# Patient Record
Sex: FEMALE | Race: WHITE | NOT HISPANIC OR LATINO | URBAN - METROPOLITAN AREA
[De-identification: names, ages, dates, MRNs, and addresses within clinical notes are randomized per-mention and may not be internally consistent; named-entity substitution may affect disease eponyms.]

---

## 2021-01-01 ENCOUNTER — INPATIENT (INPATIENT)
Facility: HOSPITAL | Age: 66
LOS: 7 days | End: 2021-06-11
Attending: STUDENT IN AN ORGANIZED HEALTH CARE EDUCATION/TRAINING PROGRAM | Admitting: STUDENT IN AN ORGANIZED HEALTH CARE EDUCATION/TRAINING PROGRAM
Payer: MEDICARE

## 2021-01-01 VITALS
DIASTOLIC BLOOD PRESSURE: 95 MMHG | OXYGEN SATURATION: 94 % | HEART RATE: 155 BPM | TEMPERATURE: 98 F | RESPIRATION RATE: 24 BRPM | SYSTOLIC BLOOD PRESSURE: 146 MMHG

## 2021-01-01 VITALS — RESPIRATION RATE: 26 BRPM | OXYGEN SATURATION: 81 %

## 2021-01-01 DIAGNOSIS — Z02.9 ENCOUNTER FOR ADMINISTRATIVE EXAMINATIONS, UNSPECIFIED: ICD-10-CM

## 2021-01-01 DIAGNOSIS — C56.9 MALIGNANT NEOPLASM OF UNSPECIFIED OVARY: ICD-10-CM

## 2021-01-01 DIAGNOSIS — R06.00 DYSPNEA, UNSPECIFIED: ICD-10-CM

## 2021-01-01 DIAGNOSIS — Z51.5 ENCOUNTER FOR PALLIATIVE CARE: ICD-10-CM

## 2021-01-01 DIAGNOSIS — Z87.730 PERSONAL HISTORY OF (CORRECTED) CLEFT LIP AND PALATE: Chronic | ICD-10-CM

## 2021-01-01 DIAGNOSIS — R18.0 MALIGNANT ASCITES: ICD-10-CM

## 2021-01-01 DIAGNOSIS — J96.01 ACUTE RESPIRATORY FAILURE WITH HYPOXIA: ICD-10-CM

## 2021-01-01 DIAGNOSIS — R00.0 TACHYCARDIA, UNSPECIFIED: ICD-10-CM

## 2021-01-01 DIAGNOSIS — J90 PLEURAL EFFUSION, NOT ELSEWHERE CLASSIFIED: ICD-10-CM

## 2021-01-01 DIAGNOSIS — R30.0 DYSURIA: ICD-10-CM

## 2021-01-01 DIAGNOSIS — E83.52 HYPERCALCEMIA: ICD-10-CM

## 2021-01-01 DIAGNOSIS — K59.00 CONSTIPATION, UNSPECIFIED: ICD-10-CM

## 2021-01-01 LAB
24R-OH-CALCIDIOL SERPL-MCNC: 5.6 NG/ML — LOW (ref 30–80)
24R-OH-CALCIDIOL SERPL-MCNC: <5 NG/ML — LOW (ref 30–80)
ALBUMIN FLD-MCNC: 2.2 G/DL — SIGNIFICANT CHANGE UP
ALBUMIN FLD-MCNC: 2.2 G/DL — SIGNIFICANT CHANGE UP
ALBUMIN SERPL ELPH-MCNC: 2.4 G/DL — LOW (ref 3.3–5)
ALBUMIN SERPL ELPH-MCNC: 2.8 G/DL — LOW (ref 3.3–5)
ALBUMIN SERPL ELPH-MCNC: 3 G/DL — LOW (ref 3.3–5)
ALBUMIN SERPL ELPH-MCNC: 3.2 G/DL — LOW (ref 3.3–5)
ALBUMIN SERPL ELPH-MCNC: 3.4 G/DL — SIGNIFICANT CHANGE UP (ref 3.3–5)
ALBUMIN SERPL ELPH-MCNC: 3.5 G/DL — SIGNIFICANT CHANGE UP (ref 3.3–5)
ALBUMIN SERPL ELPH-MCNC: 3.6 G/DL — SIGNIFICANT CHANGE UP (ref 3.3–5)
ALP SERPL-CCNC: 101 U/L — SIGNIFICANT CHANGE UP (ref 40–120)
ALP SERPL-CCNC: 106 U/L — SIGNIFICANT CHANGE UP (ref 40–120)
ALP SERPL-CCNC: 110 U/L — SIGNIFICANT CHANGE UP (ref 40–120)
ALP SERPL-CCNC: 136 U/L — HIGH (ref 40–120)
ALP SERPL-CCNC: 89 U/L — SIGNIFICANT CHANGE UP (ref 40–120)
ALT FLD-CCNC: 10 U/L — SIGNIFICANT CHANGE UP (ref 4–33)
ALT FLD-CCNC: 11 U/L — SIGNIFICANT CHANGE UP (ref 4–33)
ALT FLD-CCNC: 11 U/L — SIGNIFICANT CHANGE UP (ref 4–33)
ALT FLD-CCNC: 12 U/L — SIGNIFICANT CHANGE UP (ref 4–33)
ALT FLD-CCNC: 5 U/L — SIGNIFICANT CHANGE UP (ref 4–33)
ALT FLD-CCNC: 8 U/L — SIGNIFICANT CHANGE UP (ref 4–33)
ALT FLD-CCNC: 8 U/L — SIGNIFICANT CHANGE UP (ref 4–33)
ANION GAP SERPL CALC-SCNC: 16 MMOL/L — HIGH (ref 7–14)
ANION GAP SERPL CALC-SCNC: 17 MMOL/L — HIGH (ref 7–14)
ANION GAP SERPL CALC-SCNC: 17 MMOL/L — HIGH (ref 7–14)
ANION GAP SERPL CALC-SCNC: 18 MMOL/L — HIGH (ref 7–14)
ANION GAP SERPL CALC-SCNC: 18 MMOL/L — HIGH (ref 7–14)
ANION GAP SERPL CALC-SCNC: 19 MMOL/L — HIGH (ref 7–14)
ANION GAP SERPL CALC-SCNC: 20 MMOL/L — HIGH (ref 7–14)
ANION GAP SERPL CALC-SCNC: 21 MMOL/L — HIGH (ref 7–14)
ANION GAP SERPL CALC-SCNC: 21 MMOL/L — HIGH (ref 7–14)
APPEARANCE UR: CLEAR — SIGNIFICANT CHANGE UP
APTT BLD: 24.7 SEC — LOW (ref 27–36.3)
APTT BLD: 32.7 SEC — SIGNIFICANT CHANGE UP (ref 27–36.3)
APTT BLD: 35.1 SEC — SIGNIFICANT CHANGE UP (ref 27–36.3)
AST SERPL-CCNC: 22 U/L — SIGNIFICANT CHANGE UP (ref 4–32)
AST SERPL-CCNC: 27 U/L — SIGNIFICANT CHANGE UP (ref 4–32)
AST SERPL-CCNC: 29 U/L — SIGNIFICANT CHANGE UP (ref 4–32)
AST SERPL-CCNC: 29 U/L — SIGNIFICANT CHANGE UP (ref 4–32)
AST SERPL-CCNC: 30 U/L — SIGNIFICANT CHANGE UP (ref 4–32)
AST SERPL-CCNC: 30 U/L — SIGNIFICANT CHANGE UP (ref 4–32)
AST SERPL-CCNC: 35 U/L — HIGH (ref 4–32)
B PERT IGG+IGM PNL SER: ABNORMAL
BASE EXCESS BLDV CALC-SCNC: 0.4 MMOL/L — SIGNIFICANT CHANGE UP (ref -3–2)
BASE EXCESS BLDV CALC-SCNC: 4.4 MMOL/L — HIGH (ref -3–2)
BASOPHILS # BLD AUTO: 0.01 K/UL — SIGNIFICANT CHANGE UP (ref 0–0.2)
BASOPHILS # BLD AUTO: 0.02 K/UL — SIGNIFICANT CHANGE UP (ref 0–0.2)
BASOPHILS # BLD AUTO: 0.05 K/UL — SIGNIFICANT CHANGE UP (ref 0–0.2)
BASOPHILS # BLD AUTO: 0.06 K/UL — SIGNIFICANT CHANGE UP (ref 0–0.2)
BASOPHILS NFR BLD AUTO: 0.1 % — SIGNIFICANT CHANGE UP (ref 0–2)
BASOPHILS NFR BLD AUTO: 0.2 % — SIGNIFICANT CHANGE UP (ref 0–2)
BASOPHILS NFR BLD AUTO: 0.4 % — SIGNIFICANT CHANGE UP (ref 0–2)
BASOPHILS NFR BLD AUTO: 0.5 % — SIGNIFICANT CHANGE UP (ref 0–2)
BILIRUB SERPL-MCNC: 0.4 MG/DL — SIGNIFICANT CHANGE UP (ref 0.2–1.2)
BILIRUB SERPL-MCNC: 0.4 MG/DL — SIGNIFICANT CHANGE UP (ref 0.2–1.2)
BILIRUB SERPL-MCNC: 0.6 MG/DL — SIGNIFICANT CHANGE UP (ref 0.2–1.2)
BILIRUB SERPL-MCNC: 0.6 MG/DL — SIGNIFICANT CHANGE UP (ref 0.2–1.2)
BILIRUB SERPL-MCNC: 0.7 MG/DL — SIGNIFICANT CHANGE UP (ref 0.2–1.2)
BILIRUB SERPL-MCNC: 0.7 MG/DL — SIGNIFICANT CHANGE UP (ref 0.2–1.2)
BILIRUB SERPL-MCNC: 0.8 MG/DL — SIGNIFICANT CHANGE UP (ref 0.2–1.2)
BILIRUB UR-MCNC: ABNORMAL
BLD GP AB SCN SERPL QL: NEGATIVE — SIGNIFICANT CHANGE UP
BLOOD GAS VENOUS - CREATININE: 0.9 MG/DL — SIGNIFICANT CHANGE UP (ref 0.5–1.3)
BLOOD GAS VENOUS - CREATININE: SIGNIFICANT CHANGE UP MG/DL (ref 0.5–1.3)
BLOOD GAS VENOUS COMPREHENSIVE RESULT: SIGNIFICANT CHANGE UP
BUN SERPL-MCNC: 25 MG/DL — HIGH (ref 7–23)
BUN SERPL-MCNC: 28 MG/DL — HIGH (ref 7–23)
BUN SERPL-MCNC: 29 MG/DL — HIGH (ref 7–23)
BUN SERPL-MCNC: 34 MG/DL — HIGH (ref 7–23)
BUN SERPL-MCNC: 40 MG/DL — HIGH (ref 7–23)
BUN SERPL-MCNC: 57 MG/DL — HIGH (ref 7–23)
BUN SERPL-MCNC: 67 MG/DL — HIGH (ref 7–23)
BUN SERPL-MCNC: 76 MG/DL — HIGH (ref 7–23)
BUN SERPL-MCNC: 97 MG/DL — HIGH (ref 7–23)
CA-I BLD-SCNC: 1.43 MMOL/L — HIGH (ref 1.15–1.29)
CALCIUM SERPL-MCNC: 10.1 MG/DL — SIGNIFICANT CHANGE UP (ref 8.4–10.5)
CALCIUM SERPL-MCNC: 10.2 MG/DL — SIGNIFICANT CHANGE UP (ref 8.4–10.5)
CALCIUM SERPL-MCNC: 10.4 MG/DL — SIGNIFICANT CHANGE UP (ref 8.4–10.5)
CALCIUM SERPL-MCNC: 10.8 MG/DL — HIGH (ref 8.4–10.5)
CALCIUM SERPL-MCNC: 11.5 MG/DL — HIGH (ref 8.4–10.5)
CALCIUM SERPL-MCNC: 12.2 MG/DL — HIGH (ref 8.4–10.5)
CALCIUM SERPL-MCNC: 12.5 MG/DL — HIGH (ref 8.4–10.5)
CALCIUM SERPL-MCNC: 9.6 MG/DL — SIGNIFICANT CHANGE UP (ref 8.4–10.5)
CALCIUM SERPL-MCNC: 9.8 MG/DL — SIGNIFICANT CHANGE UP (ref 8.4–10.5)
CANCER AG125 SERPL-ACNC: 1189 U/ML — HIGH
CANCER AG19-9 SERPL-ACNC: 4 U/ML — SIGNIFICANT CHANGE UP
CEA SERPL-MCNC: 7 NG/ML — HIGH (ref 1–3.8)
CHLORIDE BLDV-SCNC: 90 MMOL/L — LOW (ref 96–108)
CHLORIDE BLDV-SCNC: 96 MMOL/L — SIGNIFICANT CHANGE UP (ref 96–108)
CHLORIDE SERPL-SCNC: 85 MMOL/L — LOW (ref 98–107)
CHLORIDE SERPL-SCNC: 87 MMOL/L — LOW (ref 98–107)
CHLORIDE SERPL-SCNC: 87 MMOL/L — LOW (ref 98–107)
CHLORIDE SERPL-SCNC: 88 MMOL/L — LOW (ref 98–107)
CHLORIDE SERPL-SCNC: 89 MMOL/L — LOW (ref 98–107)
CHLORIDE SERPL-SCNC: 90 MMOL/L — LOW (ref 98–107)
CHLORIDE SERPL-SCNC: 91 MMOL/L — LOW (ref 98–107)
CO2 SERPL-SCNC: 19 MMOL/L — LOW (ref 22–31)
CO2 SERPL-SCNC: 22 MMOL/L — SIGNIFICANT CHANGE UP (ref 22–31)
CO2 SERPL-SCNC: 23 MMOL/L — SIGNIFICANT CHANGE UP (ref 22–31)
CO2 SERPL-SCNC: 23 MMOL/L — SIGNIFICANT CHANGE UP (ref 22–31)
CO2 SERPL-SCNC: 24 MMOL/L — SIGNIFICANT CHANGE UP (ref 22–31)
CO2 SERPL-SCNC: 26 MMOL/L — SIGNIFICANT CHANGE UP (ref 22–31)
COLOR FLD: ABNORMAL
COLOR SPEC: YELLOW — SIGNIFICANT CHANGE UP
COVID-19 SPIKE DOMAIN AB INTERP: POSITIVE
COVID-19 SPIKE DOMAIN ANTIBODY RESULT: 138 U/ML — HIGH
CREAT SERPL-MCNC: 0.83 MG/DL — SIGNIFICANT CHANGE UP (ref 0.5–1.3)
CREAT SERPL-MCNC: 0.88 MG/DL — SIGNIFICANT CHANGE UP (ref 0.5–1.3)
CREAT SERPL-MCNC: 0.9 MG/DL — SIGNIFICANT CHANGE UP (ref 0.5–1.3)
CREAT SERPL-MCNC: 0.92 MG/DL — SIGNIFICANT CHANGE UP (ref 0.5–1.3)
CREAT SERPL-MCNC: 0.99 MG/DL — SIGNIFICANT CHANGE UP (ref 0.5–1.3)
CREAT SERPL-MCNC: 1 MG/DL — SIGNIFICANT CHANGE UP (ref 0.5–1.3)
CREAT SERPL-MCNC: 1.01 MG/DL — SIGNIFICANT CHANGE UP (ref 0.5–1.3)
CREAT SERPL-MCNC: 1.13 MG/DL — SIGNIFICANT CHANGE UP (ref 0.5–1.3)
CREAT SERPL-MCNC: 1.31 MG/DL — HIGH (ref 0.5–1.3)
CULTURE RESULTS: NO GROWTH — SIGNIFICANT CHANGE UP
CULTURE RESULTS: SIGNIFICANT CHANGE UP
DIFF PNL FLD: ABNORMAL
EOSINOPHIL # BLD AUTO: 0 K/UL — SIGNIFICANT CHANGE UP (ref 0–0.5)
EOSINOPHIL # BLD AUTO: 0.01 K/UL — SIGNIFICANT CHANGE UP (ref 0–0.5)
EOSINOPHIL # BLD AUTO: 0.01 K/UL — SIGNIFICANT CHANGE UP (ref 0–0.5)
EOSINOPHIL # BLD AUTO: 0.28 K/UL — SIGNIFICANT CHANGE UP (ref 0–0.5)
EOSINOPHIL # FLD: 0 % — SIGNIFICANT CHANGE UP
EOSINOPHIL NFR BLD AUTO: 0 % — SIGNIFICANT CHANGE UP (ref 0–6)
EOSINOPHIL NFR BLD AUTO: 0.1 % — SIGNIFICANT CHANGE UP (ref 0–6)
EOSINOPHIL NFR BLD AUTO: 0.1 % — SIGNIFICANT CHANGE UP (ref 0–6)
EOSINOPHIL NFR BLD AUTO: 2.1 % — SIGNIFICANT CHANGE UP (ref 0–6)
FLUID INTAKE SUBSTANCE CLASS: SIGNIFICANT CHANGE UP
FLUID SEGMENTED GRANULOCYTES: 52 % — SIGNIFICANT CHANGE UP
FOLATE SERPL-MCNC: 2.4 NG/ML — LOW (ref 3.1–17.5)
FOLATE+VIT B12 SERBLD-IMP: 0 % — SIGNIFICANT CHANGE UP
GAS PNL BLDV: 131 MMOL/L — LOW (ref 136–146)
GAS PNL BLDV: 131 MMOL/L — LOW (ref 136–146)
GLUCOSE BLDC GLUCOMTR-MCNC: 147 MG/DL — HIGH (ref 70–99)
GLUCOSE BLDV-MCNC: 121 MG/DL — HIGH (ref 70–99)
GLUCOSE BLDV-MCNC: 158 MG/DL — HIGH (ref 70–99)
GLUCOSE FLD-MCNC: 110 MG/DL — SIGNIFICANT CHANGE UP
GLUCOSE FLD-MCNC: 65 MG/DL — SIGNIFICANT CHANGE UP
GLUCOSE SERPL-MCNC: 100 MG/DL — HIGH (ref 70–99)
GLUCOSE SERPL-MCNC: 108 MG/DL — HIGH (ref 70–99)
GLUCOSE SERPL-MCNC: 108 MG/DL — HIGH (ref 70–99)
GLUCOSE SERPL-MCNC: 117 MG/DL — HIGH (ref 70–99)
GLUCOSE SERPL-MCNC: 122 MG/DL — HIGH (ref 70–99)
GLUCOSE SERPL-MCNC: 123 MG/DL — HIGH (ref 70–99)
GLUCOSE SERPL-MCNC: 154 MG/DL — HIGH (ref 70–99)
GLUCOSE SERPL-MCNC: 74 MG/DL — SIGNIFICANT CHANGE UP (ref 70–99)
GLUCOSE SERPL-MCNC: 97 MG/DL — SIGNIFICANT CHANGE UP (ref 70–99)
GLUCOSE UR QL: NEGATIVE — SIGNIFICANT CHANGE UP
GRAM STN FLD: SIGNIFICANT CHANGE UP
HCO3 BLDV-SCNC: 24 MMOL/L — SIGNIFICANT CHANGE UP (ref 20–27)
HCO3 BLDV-SCNC: 28 MMOL/L — HIGH (ref 20–27)
HCT VFR BLD CALC: 35.2 % — SIGNIFICANT CHANGE UP (ref 34.5–45)
HCT VFR BLD CALC: 36.9 % — SIGNIFICANT CHANGE UP (ref 34.5–45)
HCT VFR BLD CALC: 37.7 % — SIGNIFICANT CHANGE UP (ref 34.5–45)
HCT VFR BLD CALC: 38.1 % — SIGNIFICANT CHANGE UP (ref 34.5–45)
HCT VFR BLD CALC: 38.6 % — SIGNIFICANT CHANGE UP (ref 34.5–45)
HCT VFR BLD CALC: 39 % — SIGNIFICANT CHANGE UP (ref 34.5–45)
HCT VFR BLD CALC: 39.6 % — SIGNIFICANT CHANGE UP (ref 34.5–45)
HCT VFR BLD CALC: 41.2 % — SIGNIFICANT CHANGE UP (ref 34.5–45)
HCT VFR BLD CALC: 44.8 % — SIGNIFICANT CHANGE UP (ref 34.5–45)
HCT VFR BLDA CALC: 43.1 % — SIGNIFICANT CHANGE UP (ref 34.5–46.5)
HCT VFR BLDA CALC: 48.3 % — HIGH (ref 34.5–46.5)
HCV AB S/CO SERPL IA: 0.17 S/CO — SIGNIFICANT CHANGE UP (ref 0–0.99)
HCV AB SERPL-IMP: SIGNIFICANT CHANGE UP
HGB BLD CALC-MCNC: 14.1 G/DL — SIGNIFICANT CHANGE UP (ref 11.5–15.5)
HGB BLD CALC-MCNC: 15.8 G/DL — HIGH (ref 11.5–15.5)
HGB BLD-MCNC: 11.4 G/DL — LOW (ref 11.5–15.5)
HGB BLD-MCNC: 12.1 G/DL — SIGNIFICANT CHANGE UP (ref 11.5–15.5)
HGB BLD-MCNC: 12.2 G/DL — SIGNIFICANT CHANGE UP (ref 11.5–15.5)
HGB BLD-MCNC: 12.9 G/DL — SIGNIFICANT CHANGE UP (ref 11.5–15.5)
HGB BLD-MCNC: 12.9 G/DL — SIGNIFICANT CHANGE UP (ref 11.5–15.5)
HGB BLD-MCNC: 13 G/DL — SIGNIFICANT CHANGE UP (ref 11.5–15.5)
HGB BLD-MCNC: 13.2 G/DL — SIGNIFICANT CHANGE UP (ref 11.5–15.5)
HGB BLD-MCNC: 13.4 G/DL — SIGNIFICANT CHANGE UP (ref 11.5–15.5)
HGB BLD-MCNC: 15 G/DL — SIGNIFICANT CHANGE UP (ref 11.5–15.5)
HIV 1+2 AB+HIV1 P24 AG SERPL QL IA: SIGNIFICANT CHANGE UP
IANC: 10.18 K/UL — HIGH (ref 1.5–8.5)
IANC: 10.8 K/UL — HIGH (ref 1.5–8.5)
IANC: 10.93 K/UL — HIGH (ref 1.5–8.5)
IANC: 11.51 K/UL — HIGH (ref 1.5–8.5)
IANC: 7.02 K/UL — SIGNIFICANT CHANGE UP (ref 1.5–8.5)
IANC: 8.09 K/UL — SIGNIFICANT CHANGE UP (ref 1.5–8.5)
IMM GRANULOCYTES NFR BLD AUTO: 0.6 % — SIGNIFICANT CHANGE UP (ref 0–1.5)
IMM GRANULOCYTES NFR BLD AUTO: 0.7 % — SIGNIFICANT CHANGE UP (ref 0–1.5)
IMM GRANULOCYTES NFR BLD AUTO: 0.8 % — SIGNIFICANT CHANGE UP (ref 0–1.5)
IMM GRANULOCYTES NFR BLD AUTO: 0.9 % — SIGNIFICANT CHANGE UP (ref 0–1.5)
IMM GRANULOCYTES NFR BLD AUTO: 2.4 % — HIGH (ref 0–1.5)
IMM GRANULOCYTES NFR BLD AUTO: 2.6 % — HIGH (ref 0–1.5)
INR BLD: 1.18 RATIO — HIGH (ref 0.88–1.16)
INR BLD: 1.43 RATIO — HIGH (ref 0.88–1.16)
INR BLD: 1.46 RATIO — HIGH (ref 0.88–1.16)
KETONES UR-MCNC: ABNORMAL
LACTATE BLDV-MCNC: 2.6 MMOL/L — HIGH (ref 0.5–2)
LACTATE BLDV-MCNC: 3.3 MMOL/L — HIGH (ref 0.5–2)
LDH SERPL L TO P-CCNC: 473 U/L — SIGNIFICANT CHANGE UP
LDH SERPL L TO P-CCNC: 798 U/L — SIGNIFICANT CHANGE UP
LEUKOCYTE ESTERASE UR-ACNC: NEGATIVE — SIGNIFICANT CHANGE UP
LYMPHOCYTES # BLD AUTO: 0.36 K/UL — LOW (ref 1–3.3)
LYMPHOCYTES # BLD AUTO: 0.7 K/UL — LOW (ref 1–3.3)
LYMPHOCYTES # BLD AUTO: 0.71 K/UL — LOW (ref 1–3.3)
LYMPHOCYTES # BLD AUTO: 0.74 K/UL — LOW (ref 1–3.3)
LYMPHOCYTES # BLD AUTO: 0.76 K/UL — LOW (ref 1–3.3)
LYMPHOCYTES # BLD AUTO: 0.92 K/UL — LOW (ref 1–3.3)
LYMPHOCYTES # BLD AUTO: 10.3 % — LOW (ref 13–44)
LYMPHOCYTES # BLD AUTO: 2.8 % — LOW (ref 13–44)
LYMPHOCYTES # BLD AUTO: 5.3 % — LOW (ref 13–44)
LYMPHOCYTES # BLD AUTO: 5.7 % — LOW (ref 13–44)
LYMPHOCYTES # BLD AUTO: 6.1 % — LOW (ref 13–44)
LYMPHOCYTES # BLD AUTO: 7.4 % — LOW (ref 13–44)
LYMPHOCYTES # FLD: 13 % — SIGNIFICANT CHANGE UP
MAGNESIUM SERPL-MCNC: 2.1 MG/DL — SIGNIFICANT CHANGE UP (ref 1.6–2.6)
MAGNESIUM SERPL-MCNC: 2.1 MG/DL — SIGNIFICANT CHANGE UP (ref 1.6–2.6)
MAGNESIUM SERPL-MCNC: 2.3 MG/DL — SIGNIFICANT CHANGE UP (ref 1.6–2.6)
MAGNESIUM SERPL-MCNC: 2.8 MG/DL — HIGH (ref 1.6–2.6)
MAGNESIUM SERPL-MCNC: 2.9 MG/DL — HIGH (ref 1.6–2.6)
MCHC RBC-ENTMCNC: 28.1 PG — SIGNIFICANT CHANGE UP (ref 27–34)
MCHC RBC-ENTMCNC: 28.1 PG — SIGNIFICANT CHANGE UP (ref 27–34)
MCHC RBC-ENTMCNC: 28.2 PG — SIGNIFICANT CHANGE UP (ref 27–34)
MCHC RBC-ENTMCNC: 28.4 PG — SIGNIFICANT CHANGE UP (ref 27–34)
MCHC RBC-ENTMCNC: 28.5 PG — SIGNIFICANT CHANGE UP (ref 27–34)
MCHC RBC-ENTMCNC: 28.7 PG — SIGNIFICANT CHANGE UP (ref 27–34)
MCHC RBC-ENTMCNC: 28.7 PG — SIGNIFICANT CHANGE UP (ref 27–34)
MCHC RBC-ENTMCNC: 32.4 GM/DL — SIGNIFICANT CHANGE UP (ref 32–36)
MCHC RBC-ENTMCNC: 32.4 GM/DL — SIGNIFICANT CHANGE UP (ref 32–36)
MCHC RBC-ENTMCNC: 32.5 GM/DL — SIGNIFICANT CHANGE UP (ref 32–36)
MCHC RBC-ENTMCNC: 32.8 GM/DL — SIGNIFICANT CHANGE UP (ref 32–36)
MCHC RBC-ENTMCNC: 33.3 GM/DL — SIGNIFICANT CHANGE UP (ref 32–36)
MCHC RBC-ENTMCNC: 33.3 GM/DL — SIGNIFICANT CHANGE UP (ref 32–36)
MCHC RBC-ENTMCNC: 33.4 GM/DL — SIGNIFICANT CHANGE UP (ref 32–36)
MCHC RBC-ENTMCNC: 33.5 GM/DL — SIGNIFICANT CHANGE UP (ref 32–36)
MCHC RBC-ENTMCNC: 33.9 GM/DL — SIGNIFICANT CHANGE UP (ref 32–36)
MCV RBC AUTO: 84.7 FL — SIGNIFICANT CHANGE UP (ref 80–100)
MCV RBC AUTO: 85 FL — SIGNIFICANT CHANGE UP (ref 80–100)
MCV RBC AUTO: 85.5 FL — SIGNIFICANT CHANGE UP (ref 80–100)
MCV RBC AUTO: 85.5 FL — SIGNIFICANT CHANGE UP (ref 80–100)
MCV RBC AUTO: 85.8 FL — SIGNIFICANT CHANGE UP (ref 80–100)
MCV RBC AUTO: 86.4 FL — SIGNIFICANT CHANGE UP (ref 80–100)
MCV RBC AUTO: 86.7 FL — SIGNIFICANT CHANGE UP (ref 80–100)
MCV RBC AUTO: 86.8 FL — SIGNIFICANT CHANGE UP (ref 80–100)
MCV RBC AUTO: 87.3 FL — SIGNIFICANT CHANGE UP (ref 80–100)
MESOTHL CELL # FLD: 4 % — SIGNIFICANT CHANGE UP
MONOCYTES # BLD AUTO: 0.75 K/UL — SIGNIFICANT CHANGE UP (ref 0–0.9)
MONOCYTES # BLD AUTO: 0.9 K/UL — SIGNIFICANT CHANGE UP (ref 0–0.9)
MONOCYTES # BLD AUTO: 0.92 K/UL — HIGH (ref 0–0.9)
MONOCYTES # BLD AUTO: 0.99 K/UL — HIGH (ref 0–0.9)
MONOCYTES # BLD AUTO: 1.07 K/UL — HIGH (ref 0–0.9)
MONOCYTES # BLD AUTO: 1.12 K/UL — HIGH (ref 0–0.9)
MONOCYTES NFR BLD AUTO: 10.3 % — SIGNIFICANT CHANGE UP (ref 2–14)
MONOCYTES NFR BLD AUTO: 10.7 % — SIGNIFICANT CHANGE UP (ref 2–14)
MONOCYTES NFR BLD AUTO: 6 % — SIGNIFICANT CHANGE UP (ref 2–14)
MONOCYTES NFR BLD AUTO: 7 % — SIGNIFICANT CHANGE UP (ref 2–14)
MONOCYTES NFR BLD AUTO: 7.4 % — SIGNIFICANT CHANGE UP (ref 2–14)
MONOCYTES NFR BLD AUTO: 9.1 % — SIGNIFICANT CHANGE UP (ref 2–14)
MONOS+MACROS # FLD: 31 % — SIGNIFICANT CHANGE UP
NEUTROPHILS # BLD AUTO: 10.18 K/UL — HIGH (ref 1.8–7.4)
NEUTROPHILS # BLD AUTO: 10.8 K/UL — HIGH (ref 1.8–7.4)
NEUTROPHILS # BLD AUTO: 10.93 K/UL — HIGH (ref 1.8–7.4)
NEUTROPHILS # BLD AUTO: 11.51 K/UL — HIGH (ref 1.8–7.4)
NEUTROPHILS # BLD AUTO: 7.02 K/UL — SIGNIFICANT CHANGE UP (ref 1.8–7.4)
NEUTROPHILS # BLD AUTO: 8.09 K/UL — HIGH (ref 1.8–7.4)
NEUTROPHILS NFR BLD AUTO: 78.3 % — HIGH (ref 43–77)
NEUTROPHILS NFR BLD AUTO: 81 % — HIGH (ref 43–77)
NEUTROPHILS NFR BLD AUTO: 82.1 % — HIGH (ref 43–77)
NEUTROPHILS NFR BLD AUTO: 82.4 % — HIGH (ref 43–77)
NEUTROPHILS NFR BLD AUTO: 87.1 % — HIGH (ref 43–77)
NEUTROPHILS NFR BLD AUTO: 89.1 % — HIGH (ref 43–77)
NITRITE UR-MCNC: NEGATIVE — SIGNIFICANT CHANGE UP
NON-GYNECOLOGICAL CYTOLOGY STUDY: SIGNIFICANT CHANGE UP
NRBC # BLD: 0 /100 WBCS — SIGNIFICANT CHANGE UP
NRBC # FLD: 0 K/UL — SIGNIFICANT CHANGE UP
NT-PROBNP SERPL-SCNC: 572 PG/ML — HIGH
OTHER CELLS FLD MANUAL: 0 % — SIGNIFICANT CHANGE UP
PCO2 BLDV: 36 MMHG — LOW (ref 41–51)
PCO2 BLDV: 39 MMHG — LOW (ref 41–51)
PH BLDV: 7.41 — SIGNIFICANT CHANGE UP (ref 7.32–7.43)
PH BLDV: 7.5 — HIGH (ref 7.32–7.43)
PH FLD: 7.5 — SIGNIFICANT CHANGE UP
PH UR: 6.5 — SIGNIFICANT CHANGE UP (ref 5–8)
PHOSPHATE SERPL-MCNC: 2 MG/DL — LOW (ref 2.5–4.5)
PHOSPHATE SERPL-MCNC: 2.5 MG/DL — SIGNIFICANT CHANGE UP (ref 2.5–4.5)
PHOSPHATE SERPL-MCNC: 3.7 MG/DL — SIGNIFICANT CHANGE UP (ref 2.5–4.5)
PHOSPHATE SERPL-MCNC: 3.7 MG/DL — SIGNIFICANT CHANGE UP (ref 2.5–4.5)
PHOSPHATE SERPL-MCNC: 4.3 MG/DL — SIGNIFICANT CHANGE UP (ref 2.5–4.5)
PLATELET # BLD AUTO: 430 K/UL — HIGH (ref 150–400)
PLATELET # BLD AUTO: 436 K/UL — HIGH (ref 150–400)
PLATELET # BLD AUTO: 464 K/UL — HIGH (ref 150–400)
PLATELET # BLD AUTO: 471 K/UL — HIGH (ref 150–400)
PLATELET # BLD AUTO: 482 K/UL — HIGH (ref 150–400)
PLATELET # BLD AUTO: 496 K/UL — HIGH (ref 150–400)
PLATELET # BLD AUTO: 513 K/UL — HIGH (ref 150–400)
PLATELET # BLD AUTO: 549 K/UL — HIGH (ref 150–400)
PLATELET # BLD AUTO: 589 K/UL — HIGH (ref 150–400)
PO2 BLDV: 35 MMHG — SIGNIFICANT CHANGE UP (ref 35–40)
PO2 BLDV: 40 MMHG — SIGNIFICANT CHANGE UP (ref 35–40)
POTASSIUM BLDV-SCNC: 3.5 MMOL/L — SIGNIFICANT CHANGE UP (ref 3.4–4.5)
POTASSIUM BLDV-SCNC: 4.2 MMOL/L — SIGNIFICANT CHANGE UP (ref 3.4–4.5)
POTASSIUM SERPL-MCNC: 3.5 MMOL/L — SIGNIFICANT CHANGE UP (ref 3.5–5.3)
POTASSIUM SERPL-MCNC: 3.6 MMOL/L — SIGNIFICANT CHANGE UP (ref 3.5–5.3)
POTASSIUM SERPL-MCNC: 3.7 MMOL/L — SIGNIFICANT CHANGE UP (ref 3.5–5.3)
POTASSIUM SERPL-MCNC: 3.7 MMOL/L — SIGNIFICANT CHANGE UP (ref 3.5–5.3)
POTASSIUM SERPL-MCNC: 3.8 MMOL/L — SIGNIFICANT CHANGE UP (ref 3.5–5.3)
POTASSIUM SERPL-MCNC: 4 MMOL/L — SIGNIFICANT CHANGE UP (ref 3.5–5.3)
POTASSIUM SERPL-MCNC: 4.2 MMOL/L — SIGNIFICANT CHANGE UP (ref 3.5–5.3)
POTASSIUM SERPL-SCNC: 3.5 MMOL/L — SIGNIFICANT CHANGE UP (ref 3.5–5.3)
POTASSIUM SERPL-SCNC: 3.6 MMOL/L — SIGNIFICANT CHANGE UP (ref 3.5–5.3)
POTASSIUM SERPL-SCNC: 3.7 MMOL/L — SIGNIFICANT CHANGE UP (ref 3.5–5.3)
POTASSIUM SERPL-SCNC: 3.7 MMOL/L — SIGNIFICANT CHANGE UP (ref 3.5–5.3)
POTASSIUM SERPL-SCNC: 3.8 MMOL/L — SIGNIFICANT CHANGE UP (ref 3.5–5.3)
POTASSIUM SERPL-SCNC: 4 MMOL/L — SIGNIFICANT CHANGE UP (ref 3.5–5.3)
POTASSIUM SERPL-SCNC: 4.2 MMOL/L — SIGNIFICANT CHANGE UP (ref 3.5–5.3)
PROT FLD-MCNC: 3.7 G/DL — SIGNIFICANT CHANGE UP
PROT FLD-MCNC: 3.7 G/DL — SIGNIFICANT CHANGE UP
PROT SERPL-MCNC: 5.6 G/DL — LOW (ref 6–8.3)
PROT SERPL-MCNC: 5.8 G/DL — LOW (ref 6–8.3)
PROT SERPL-MCNC: 6 G/DL — SIGNIFICANT CHANGE UP (ref 6–8.3)
PROT SERPL-MCNC: 6.1 G/DL — SIGNIFICANT CHANGE UP (ref 6–8.3)
PROT SERPL-MCNC: 6.1 G/DL — SIGNIFICANT CHANGE UP (ref 6–8.3)
PROT SERPL-MCNC: 6.3 G/DL — SIGNIFICANT CHANGE UP (ref 6–8.3)
PROT SERPL-MCNC: 6.6 G/DL — SIGNIFICANT CHANGE UP (ref 6–8.3)
PROT UR-MCNC: ABNORMAL
PROTHROM AB SERPL-ACNC: 13.5 SEC — SIGNIFICANT CHANGE UP (ref 10.6–13.6)
PROTHROM AB SERPL-ACNC: 16.2 SEC — HIGH (ref 10.6–13.6)
PROTHROM AB SERPL-ACNC: 16.4 SEC — HIGH (ref 10.6–13.6)
PTH-INTACT FLD-MCNC: 9 PG/ML — LOW (ref 15–65)
RBC # BLD: 4.06 M/UL — SIGNIFICANT CHANGE UP (ref 3.8–5.2)
RBC # BLD: 4.25 M/UL — SIGNIFICANT CHANGE UP (ref 3.8–5.2)
RBC # BLD: 4.32 M/UL — SIGNIFICANT CHANGE UP (ref 3.8–5.2)
RBC # BLD: 4.5 M/UL — SIGNIFICANT CHANGE UP (ref 3.8–5.2)
RBC # BLD: 4.54 M/UL — SIGNIFICANT CHANGE UP (ref 3.8–5.2)
RBC # BLD: 4.56 M/UL — SIGNIFICANT CHANGE UP (ref 3.8–5.2)
RBC # BLD: 4.63 M/UL — SIGNIFICANT CHANGE UP (ref 3.8–5.2)
RBC # BLD: 4.77 M/UL — SIGNIFICANT CHANGE UP (ref 3.8–5.2)
RBC # BLD: 5.22 M/UL — HIGH (ref 3.8–5.2)
RBC # FLD: 12.6 % — SIGNIFICANT CHANGE UP (ref 10.3–14.5)
RBC # FLD: 12.7 % — SIGNIFICANT CHANGE UP (ref 10.3–14.5)
RBC # FLD: 12.7 % — SIGNIFICANT CHANGE UP (ref 10.3–14.5)
RBC # FLD: 12.8 % — SIGNIFICANT CHANGE UP (ref 10.3–14.5)
RBC # FLD: 12.9 % — SIGNIFICANT CHANGE UP (ref 10.3–14.5)
RBC # FLD: 13 % — SIGNIFICANT CHANGE UP (ref 10.3–14.5)
RBC # FLD: 13.2 % — SIGNIFICANT CHANGE UP (ref 10.3–14.5)
RBC # FLD: 13.3 % — SIGNIFICANT CHANGE UP (ref 10.3–14.5)
RBC # FLD: 13.3 % — SIGNIFICANT CHANGE UP (ref 10.3–14.5)
RCV VOL RI: 1000 CELLS/UL — HIGH (ref 0–5)
RH IG SCN BLD-IMP: POSITIVE — SIGNIFICANT CHANGE UP
SAO2 % BLDV: 71.4 % — SIGNIFICANT CHANGE UP (ref 60–85)
SAO2 % BLDV: 75.1 % — SIGNIFICANT CHANGE UP (ref 60–85)
SARS-COV-2 IGG+IGM SERPL QL IA: 138 U/ML — HIGH
SARS-COV-2 IGG+IGM SERPL QL IA: POSITIVE
SARS-COV-2 RNA SPEC QL NAA+PROBE: SIGNIFICANT CHANGE UP
SODIUM SERPL-SCNC: 128 MMOL/L — LOW (ref 135–145)
SODIUM SERPL-SCNC: 130 MMOL/L — LOW (ref 135–145)
SODIUM SERPL-SCNC: 130 MMOL/L — LOW (ref 135–145)
SODIUM SERPL-SCNC: 131 MMOL/L — LOW (ref 135–145)
SODIUM SERPL-SCNC: 134 MMOL/L — LOW (ref 135–145)
SP GR SPEC: >1.05 (ref 1.01–1.02)
SPECIMEN SOURCE FLD: SIGNIFICANT CHANGE UP
SPECIMEN SOURCE FLD: SIGNIFICANT CHANGE UP
SPECIMEN SOURCE: SIGNIFICANT CHANGE UP
TOTAL NUCLEATED CELL COUNT, BODY FLUID: 40 CELLS/UL — HIGH (ref 0–5)
TROPONIN T, HIGH SENSITIVITY RESULT: 17 NG/L — SIGNIFICANT CHANGE UP
TROPONIN T, HIGH SENSITIVITY RESULT: 22 NG/L — SIGNIFICANT CHANGE UP
TSH SERPL-MCNC: 0.8 UIU/ML — SIGNIFICANT CHANGE UP (ref 0.27–4.2)
TUBE TYPE: SIGNIFICANT CHANGE UP
UROBILINOGEN FLD QL: ABNORMAL
VIT B12 SERPL-MCNC: 395 PG/ML — SIGNIFICANT CHANGE UP (ref 200–900)
WBC # BLD: 10.57 K/UL — HIGH (ref 3.8–10.5)
WBC # BLD: 12.35 K/UL — HIGH (ref 3.8–10.5)
WBC # BLD: 12.41 K/UL — HIGH (ref 3.8–10.5)
WBC # BLD: 12.9 K/UL — HIGH (ref 3.8–10.5)
WBC # BLD: 13.32 K/UL — HIGH (ref 3.8–10.5)
WBC # BLD: 8.88 K/UL — SIGNIFICANT CHANGE UP (ref 3.8–10.5)
WBC # BLD: 8.96 K/UL — SIGNIFICANT CHANGE UP (ref 3.8–10.5)
WBC # BLD: 9.67 K/UL — SIGNIFICANT CHANGE UP (ref 3.8–10.5)
WBC # BLD: 9.99 K/UL — SIGNIFICANT CHANGE UP (ref 3.8–10.5)
WBC # FLD AUTO: 10.57 K/UL — HIGH (ref 3.8–10.5)
WBC # FLD AUTO: 12.35 K/UL — HIGH (ref 3.8–10.5)
WBC # FLD AUTO: 12.41 K/UL — HIGH (ref 3.8–10.5)
WBC # FLD AUTO: 12.9 K/UL — HIGH (ref 3.8–10.5)
WBC # FLD AUTO: 13.32 K/UL — HIGH (ref 3.8–10.5)
WBC # FLD AUTO: 8.88 K/UL — SIGNIFICANT CHANGE UP (ref 3.8–10.5)
WBC # FLD AUTO: 8.96 K/UL — SIGNIFICANT CHANGE UP (ref 3.8–10.5)
WBC # FLD AUTO: 9.67 K/UL — SIGNIFICANT CHANGE UP (ref 3.8–10.5)
WBC # FLD AUTO: 9.99 K/UL — SIGNIFICANT CHANGE UP (ref 3.8–10.5)

## 2021-01-01 PROCEDURE — 99232 SBSQ HOSP IP/OBS MODERATE 35: CPT

## 2021-01-01 PROCEDURE — 88112 CYTOPATH CELL ENHANCE TECH: CPT | Mod: 26,59

## 2021-01-01 PROCEDURE — 99221 1ST HOSP IP/OBS SF/LOW 40: CPT

## 2021-01-01 PROCEDURE — 99232 SBSQ HOSP IP/OBS MODERATE 35: CPT | Mod: GC

## 2021-01-01 PROCEDURE — 99223 1ST HOSP IP/OBS HIGH 75: CPT | Mod: GC

## 2021-01-01 PROCEDURE — 93010 ELECTROCARDIOGRAM REPORT: CPT

## 2021-01-01 PROCEDURE — 99222 1ST HOSP IP/OBS MODERATE 55: CPT

## 2021-01-01 PROCEDURE — 49083 ABD PARACENTESIS W/IMAGING: CPT

## 2021-01-01 PROCEDURE — 88342 IMHCHEM/IMCYTCHM 1ST ANTB: CPT | Mod: 26,59

## 2021-01-01 PROCEDURE — 99223 1ST HOSP IP/OBS HIGH 75: CPT

## 2021-01-01 PROCEDURE — 88360 TUMOR IMMUNOHISTOCHEM/MANUAL: CPT | Mod: 26

## 2021-01-01 PROCEDURE — 99231 SBSQ HOSP IP/OBS SF/LOW 25: CPT

## 2021-01-01 PROCEDURE — 93306 TTE W/DOPPLER COMPLETE: CPT | Mod: 26

## 2021-01-01 PROCEDURE — 71045 X-RAY EXAM CHEST 1 VIEW: CPT | Mod: 26

## 2021-01-01 PROCEDURE — 88341 IMHCHEM/IMCYTCHM EA ADD ANTB: CPT | Mod: 26,59

## 2021-01-01 PROCEDURE — 76705 ECHO EXAM OF ABDOMEN: CPT | Mod: 26

## 2021-01-01 PROCEDURE — 99233 SBSQ HOSP IP/OBS HIGH 50: CPT | Mod: GC

## 2021-01-01 PROCEDURE — 49180 BIOPSY ABDOMINAL MASS: CPT

## 2021-01-01 PROCEDURE — 77012 CT SCAN FOR NEEDLE BIOPSY: CPT | Mod: 26,59

## 2021-01-01 PROCEDURE — 88305 TISSUE EXAM BY PATHOLOGIST: CPT | Mod: 26

## 2021-01-01 PROCEDURE — 12345: CPT | Mod: NC,GC

## 2021-01-01 PROCEDURE — 99285 EMERGENCY DEPT VISIT HI MDM: CPT

## 2021-01-01 PROCEDURE — 88173 CYTOPATH EVAL FNA REPORT: CPT | Mod: 26

## 2021-01-01 PROCEDURE — 88305 TISSUE EXAM BY PATHOLOGIST: CPT | Mod: 26,59

## 2021-01-01 PROCEDURE — 71275 CT ANGIOGRAPHY CHEST: CPT | Mod: 26

## 2021-01-01 PROCEDURE — 74177 CT ABD & PELVIS W/CONTRAST: CPT | Mod: 26

## 2021-01-01 RX ORDER — ONDANSETRON 8 MG/1
4 TABLET, FILM COATED ORAL ONCE
Refills: 0 | Status: DISCONTINUED | OUTPATIENT
Start: 2021-01-01 | End: 2021-01-01

## 2021-01-01 RX ORDER — ACETAMINOPHEN 500 MG
1000 TABLET ORAL ONCE
Refills: 0 | Status: DISCONTINUED | OUTPATIENT
Start: 2021-01-01 | End: 2021-01-01

## 2021-01-01 RX ORDER — ALBUMIN HUMAN 25 %
250 VIAL (ML) INTRAVENOUS ONCE
Refills: 0 | Status: COMPLETED | OUTPATIENT
Start: 2021-01-01 | End: 2021-01-01

## 2021-01-01 RX ORDER — LANOLIN ALCOHOL/MO/W.PET/CERES
3 CREAM (GRAM) TOPICAL AT BEDTIME
Refills: 0 | Status: DISCONTINUED | OUTPATIENT
Start: 2021-01-01 | End: 2021-01-01

## 2021-01-01 RX ORDER — ROBINUL 0.2 MG/ML
0.4 INJECTION INTRAMUSCULAR; INTRAVENOUS EVERY 6 HOURS
Refills: 0 | Status: DISCONTINUED | OUTPATIENT
Start: 2021-01-01 | End: 2021-01-01

## 2021-01-01 RX ORDER — CEFTRIAXONE 500 MG/1
2000 INJECTION, POWDER, FOR SOLUTION INTRAMUSCULAR; INTRAVENOUS EVERY 24 HOURS
Refills: 0 | Status: DISCONTINUED | OUTPATIENT
Start: 2021-01-01 | End: 2021-01-01

## 2021-01-01 RX ORDER — ACETAMINOPHEN 500 MG
650 TABLET ORAL ONCE
Refills: 0 | Status: COMPLETED | OUTPATIENT
Start: 2021-01-01 | End: 2021-01-01

## 2021-01-01 RX ORDER — PIPERACILLIN AND TAZOBACTAM 4; .5 G/20ML; G/20ML
3.38 INJECTION, POWDER, LYOPHILIZED, FOR SOLUTION INTRAVENOUS EVERY 8 HOURS
Refills: 0 | Status: DISCONTINUED | OUTPATIENT
Start: 2021-01-01 | End: 2021-01-01

## 2021-01-01 RX ORDER — PIPERACILLIN AND TAZOBACTAM 4; .5 G/20ML; G/20ML
3.38 INJECTION, POWDER, LYOPHILIZED, FOR SOLUTION INTRAVENOUS ONCE
Refills: 0 | Status: COMPLETED | OUTPATIENT
Start: 2021-01-01 | End: 2021-01-01

## 2021-01-01 RX ORDER — HYDROMORPHONE HYDROCHLORIDE 2 MG/ML
0.5 INJECTION INTRAMUSCULAR; INTRAVENOUS; SUBCUTANEOUS
Refills: 0 | Status: DISCONTINUED | OUTPATIENT
Start: 2021-01-01 | End: 2021-01-01

## 2021-01-01 RX ORDER — FLUCONAZOLE 150 MG/1
200 TABLET ORAL EVERY 24 HOURS
Refills: 0 | Status: DISCONTINUED | OUTPATIENT
Start: 2021-01-01 | End: 2021-01-01

## 2021-01-01 RX ORDER — ALBUMIN HUMAN 25 %
100 VIAL (ML) INTRAVENOUS ONCE
Refills: 0 | Status: COMPLETED | OUTPATIENT
Start: 2021-01-01 | End: 2021-01-01

## 2021-01-01 RX ORDER — ALBUMIN HUMAN 25 %
100 VIAL (ML) INTRAVENOUS EVERY 6 HOURS
Refills: 0 | Status: COMPLETED | OUTPATIENT
Start: 2021-01-01 | End: 2021-01-01

## 2021-01-01 RX ORDER — FOLIC ACID 0.8 MG
1 TABLET ORAL DAILY
Refills: 0 | Status: DISCONTINUED | OUTPATIENT
Start: 2021-01-01 | End: 2021-01-01

## 2021-01-01 RX ORDER — ACETAMINOPHEN 500 MG
650 TABLET ORAL EVERY 6 HOURS
Refills: 0 | Status: DISCONTINUED | OUTPATIENT
Start: 2021-01-01 | End: 2021-01-01

## 2021-01-01 RX ORDER — SENNA PLUS 8.6 MG/1
2 TABLET ORAL AT BEDTIME
Refills: 0 | Status: DISCONTINUED | OUTPATIENT
Start: 2021-01-01 | End: 2021-01-01

## 2021-01-01 RX ORDER — ENOXAPARIN SODIUM 100 MG/ML
40 INJECTION SUBCUTANEOUS DAILY
Refills: 0 | Status: DISCONTINUED | OUTPATIENT
Start: 2021-01-01 | End: 2021-01-01

## 2021-01-01 RX ORDER — FAMOTIDINE 10 MG/ML
20 INJECTION INTRAVENOUS DAILY
Refills: 0 | Status: DISCONTINUED | OUTPATIENT
Start: 2021-01-01 | End: 2021-01-01

## 2021-01-01 RX ORDER — ALBUMIN HUMAN 25 %
100 VIAL (ML) INTRAVENOUS EVERY 6 HOURS
Refills: 0 | Status: DISCONTINUED | OUTPATIENT
Start: 2021-01-01 | End: 2021-01-01

## 2021-01-01 RX ORDER — HYDROMORPHONE HYDROCHLORIDE 2 MG/ML
0.5 INJECTION INTRAMUSCULAR; INTRAVENOUS; SUBCUTANEOUS EVERY 6 HOURS
Refills: 0 | Status: DISCONTINUED | OUTPATIENT
Start: 2021-01-01 | End: 2021-01-01

## 2021-01-01 RX ORDER — HYDROMORPHONE HYDROCHLORIDE 2 MG/ML
0.2 INJECTION INTRAMUSCULAR; INTRAVENOUS; SUBCUTANEOUS
Refills: 0 | Status: DISCONTINUED | OUTPATIENT
Start: 2021-01-01 | End: 2021-01-01

## 2021-01-01 RX ORDER — HYDROMORPHONE HYDROCHLORIDE 2 MG/ML
0.5 INJECTION INTRAMUSCULAR; INTRAVENOUS; SUBCUTANEOUS ONCE
Refills: 0 | Status: DISCONTINUED | OUTPATIENT
Start: 2021-01-01 | End: 2021-01-01

## 2021-01-01 RX ORDER — PANTOPRAZOLE SODIUM 20 MG/1
40 TABLET, DELAYED RELEASE ORAL
Refills: 0 | Status: DISCONTINUED | OUTPATIENT
Start: 2021-01-01 | End: 2021-01-01

## 2021-01-01 RX ORDER — FUROSEMIDE 40 MG
20 TABLET ORAL DAILY
Refills: 0 | Status: DISCONTINUED | OUTPATIENT
Start: 2021-01-01 | End: 2021-01-01

## 2021-01-01 RX ORDER — POLYETHYLENE GLYCOL 3350 17 G/17G
17 POWDER, FOR SOLUTION ORAL
Refills: 0 | Status: DISCONTINUED | OUTPATIENT
Start: 2021-01-01 | End: 2021-01-01

## 2021-01-01 RX ADMIN — Medication 20 MILLIGRAM(S): at 06:00

## 2021-01-01 RX ADMIN — Medication 650 MILLIGRAM(S): at 04:31

## 2021-01-01 RX ADMIN — Medication 650 MILLIGRAM(S): at 00:32

## 2021-01-01 RX ADMIN — Medication 3 MILLIGRAM(S): at 02:16

## 2021-01-01 RX ADMIN — Medication 650 MILLIGRAM(S): at 22:45

## 2021-01-01 RX ADMIN — Medication 650 MILLIGRAM(S): at 14:50

## 2021-01-01 RX ADMIN — Medication 650 MILLIGRAM(S): at 22:21

## 2021-01-01 RX ADMIN — Medication 20 MILLIGRAM(S): at 14:36

## 2021-01-01 RX ADMIN — PIPERACILLIN AND TAZOBACTAM 200 GRAM(S): 4; .5 INJECTION, POWDER, LYOPHILIZED, FOR SOLUTION INTRAVENOUS at 18:59

## 2021-01-01 RX ADMIN — CEFTRIAXONE 100 MILLIGRAM(S): 500 INJECTION, POWDER, FOR SOLUTION INTRAMUSCULAR; INTRAVENOUS at 16:36

## 2021-01-01 RX ADMIN — Medication 650 MILLIGRAM(S): at 14:13

## 2021-01-01 RX ADMIN — Medication 650 MILLIGRAM(S): at 21:49

## 2021-01-01 RX ADMIN — Medication 650 MILLIGRAM(S): at 22:15

## 2021-01-01 RX ADMIN — Medication 50 MILLILITER(S): at 12:51

## 2021-01-01 RX ADMIN — Medication 1 MILLIGRAM(S): at 11:09

## 2021-01-01 RX ADMIN — Medication 650 MILLIGRAM(S): at 21:51

## 2021-01-01 RX ADMIN — PIPERACILLIN AND TAZOBACTAM 25 GRAM(S): 4; .5 INJECTION, POWDER, LYOPHILIZED, FOR SOLUTION INTRAVENOUS at 23:04

## 2021-01-01 RX ADMIN — Medication 650 MILLIGRAM(S): at 11:30

## 2021-01-01 RX ADMIN — Medication 650 MILLIGRAM(S): at 10:41

## 2021-01-01 RX ADMIN — ENOXAPARIN SODIUM 40 MILLIGRAM(S): 100 INJECTION SUBCUTANEOUS at 12:02

## 2021-01-01 RX ADMIN — ENOXAPARIN SODIUM 40 MILLIGRAM(S): 100 INJECTION SUBCUTANEOUS at 12:06

## 2021-01-01 RX ADMIN — PIPERACILLIN AND TAZOBACTAM 25 GRAM(S): 4; .5 INJECTION, POWDER, LYOPHILIZED, FOR SOLUTION INTRAVENOUS at 06:11

## 2021-01-01 RX ADMIN — Medication 650 MILLIGRAM(S): at 05:01

## 2021-01-01 RX ADMIN — Medication 650 MILLIGRAM(S): at 21:19

## 2021-01-01 RX ADMIN — ENOXAPARIN SODIUM 40 MILLIGRAM(S): 100 INJECTION SUBCUTANEOUS at 11:09

## 2021-01-01 RX ADMIN — POLYETHYLENE GLYCOL 3350 17 GRAM(S): 17 POWDER, FOR SOLUTION ORAL at 06:32

## 2021-01-01 RX ADMIN — ENOXAPARIN SODIUM 40 MILLIGRAM(S): 100 INJECTION SUBCUTANEOUS at 12:14

## 2021-01-01 RX ADMIN — Medication 5 MILLIGRAM(S): at 23:39

## 2021-01-01 RX ADMIN — Medication 50 MILLILITER(S): at 18:57

## 2021-01-01 RX ADMIN — HYDROMORPHONE HYDROCHLORIDE 0.5 MILLIGRAM(S): 2 INJECTION INTRAMUSCULAR; INTRAVENOUS; SUBCUTANEOUS at 10:37

## 2021-01-01 RX ADMIN — Medication 50 MILLILITER(S): at 09:18

## 2021-01-01 RX ADMIN — POLYETHYLENE GLYCOL 3350 17 GRAM(S): 17 POWDER, FOR SOLUTION ORAL at 18:37

## 2021-01-01 RX ADMIN — PIPERACILLIN AND TAZOBACTAM 25 GRAM(S): 4; .5 INJECTION, POWDER, LYOPHILIZED, FOR SOLUTION INTRAVENOUS at 14:03

## 2021-01-01 RX ADMIN — FLUCONAZOLE 100 MILLIGRAM(S): 150 TABLET ORAL at 10:37

## 2021-01-01 RX ADMIN — HYDROMORPHONE HYDROCHLORIDE 0.5 MILLIGRAM(S): 2 INJECTION INTRAMUSCULAR; INTRAVENOUS; SUBCUTANEOUS at 11:10

## 2021-01-01 RX ADMIN — Medication 650 MILLIGRAM(S): at 01:02

## 2021-01-01 RX ADMIN — CEFTRIAXONE 100 MILLIGRAM(S): 500 INJECTION, POWDER, FOR SOLUTION INTRAMUSCULAR; INTRAVENOUS at 12:02

## 2021-01-01 RX ADMIN — FAMOTIDINE 20 MILLIGRAM(S): 10 INJECTION INTRAVENOUS at 12:05

## 2021-01-01 RX ADMIN — ENOXAPARIN SODIUM 40 MILLIGRAM(S): 100 INJECTION SUBCUTANEOUS at 11:10

## 2021-01-01 RX ADMIN — SENNA PLUS 2 TABLET(S): 8.6 TABLET ORAL at 23:04

## 2021-01-01 RX ADMIN — Medication 650 MILLIGRAM(S): at 17:17

## 2021-01-01 RX ADMIN — Medication 650 MILLIGRAM(S): at 17:47

## 2021-01-01 RX ADMIN — Medication 650 MILLIGRAM(S): at 11:11

## 2021-01-01 RX ADMIN — Medication 0.5 MILLIGRAM(S): at 13:40

## 2021-01-01 RX ADMIN — Medication 3 MILLIGRAM(S): at 21:25

## 2021-01-01 RX ADMIN — Medication 1 MILLIGRAM(S): at 12:05

## 2021-01-01 RX ADMIN — Medication 650 MILLIGRAM(S): at 07:35

## 2021-01-01 RX ADMIN — HYDROMORPHONE HYDROCHLORIDE 0.2 MILLIGRAM(S): 2 INJECTION INTRAMUSCULAR; INTRAVENOUS; SUBCUTANEOUS at 14:30

## 2021-01-01 RX ADMIN — PIPERACILLIN AND TAZOBACTAM 25 GRAM(S): 4; .5 INJECTION, POWDER, LYOPHILIZED, FOR SOLUTION INTRAVENOUS at 06:32

## 2021-01-01 RX ADMIN — Medication 3 MILLIGRAM(S): at 23:39

## 2021-01-01 RX ADMIN — Medication 650 MILLIGRAM(S): at 12:53

## 2021-01-01 RX ADMIN — Medication 650 MILLIGRAM(S): at 11:00

## 2021-01-01 RX ADMIN — HYDROMORPHONE HYDROCHLORIDE 0.2 MILLIGRAM(S): 2 INJECTION INTRAMUSCULAR; INTRAVENOUS; SUBCUTANEOUS at 14:37

## 2021-01-01 RX ADMIN — PIPERACILLIN AND TAZOBACTAM 200 GRAM(S): 4; .5 INJECTION, POWDER, LYOPHILIZED, FOR SOLUTION INTRAVENOUS at 11:10

## 2021-01-01 RX ADMIN — Medication 650 MILLIGRAM(S): at 13:53

## 2021-01-01 RX ADMIN — Medication 50 MILLILITER(S): at 00:31

## 2021-01-01 RX ADMIN — Medication 125 MILLILITER(S): at 12:23

## 2021-01-01 RX ADMIN — CEFTRIAXONE 100 MILLIGRAM(S): 500 INJECTION, POWDER, FOR SOLUTION INTRAMUSCULAR; INTRAVENOUS at 12:06

## 2021-01-01 RX ADMIN — Medication 650 MILLIGRAM(S): at 07:05

## 2021-01-01 RX ADMIN — Medication 3 MILLIGRAM(S): at 22:21

## 2021-01-01 RX ADMIN — ROBINUL 0.4 MILLIGRAM(S): 0.2 INJECTION INTRAMUSCULAR; INTRAVENOUS at 16:39

## 2021-01-01 RX ADMIN — Medication 50 MILLILITER(S): at 23:44

## 2021-01-01 RX ADMIN — ENOXAPARIN SODIUM 40 MILLIGRAM(S): 100 INJECTION SUBCUTANEOUS at 12:24

## 2021-01-01 RX ADMIN — Medication 50 MILLILITER(S): at 17:16

## 2021-01-01 RX ADMIN — Medication 5 MILLIGRAM(S): at 23:05

## 2021-01-01 RX ADMIN — Medication 50 MILLILITER(S): at 06:50

## 2021-01-01 RX ADMIN — PIPERACILLIN AND TAZOBACTAM 25 GRAM(S): 4; .5 INJECTION, POWDER, LYOPHILIZED, FOR SOLUTION INTRAVENOUS at 22:47

## 2021-06-03 NOTE — H&P ADULT - NSICDXPASTSURGICALHX_GEN_ALL_CORE_FT
No significant past surgical history PAST SURGICAL HISTORY:  H/O cleft lip repair as a young child

## 2021-06-03 NOTE — ED PROVIDER NOTE - CARE PLAN
Principal Discharge DX:	Dyspnea, unspecified type   Principal Discharge DX:	Dyspnea, unspecified type  Secondary Diagnosis:	Ascites, malignant  Secondary Diagnosis:	Pleural effusion

## 2021-06-03 NOTE — ED ADULT TRIAGE NOTE - CHIEF COMPLAINT QUOTE
Arrives from home reports hacking cough for 2-3 weeks with dyspnea on exertion x 1 week. Denies fever, N/V, chest pain sick contacts. Denies other PMH

## 2021-06-03 NOTE — H&P ADULT - PROBLEM SELECTOR PLAN 3
Likely causing worsening SOB and from translocation from malignant ascites.   - will trial lasix this evening Likely causing worsening SOB and from translocation from malignant ascites. 3 weeks of fatigue weakness sob associated with abdominal distention. CT chest without evidence of PE though patient tachycardic. Dyspnea likely 2/2 abdominal distention/ascites and pleural effusions likely from translocation of ascites. Improved after 2.5L paracentesis drain in the ED.   - continue with oxygen as needed  - Incentive spirometer, encourage ambulation   - can trial lasix in am if still sob  - pro-bnp wnl for age 3 weeks of fatigue weakness sob associated with abdominal distention. CT chest without evidence of PE though patient tachycardic. Dyspnea likely 2/2 abdominal distention/ascites and pleural effusions likely from translocation of ascites. Improved after 2.5L paracentesis drain in the ED.   - continue with oxygen as needed  - Incentive spirometer, encourage ambulation   - can trial lasix in am if still sob  - pro-bnp wnl for age  reporting 2 pillow orthopnea, likely related to pleural effusions, pro-bnp wnl for age and no LE edema to suggest cardiac dysfunction; if shortness of breath persists, can check echo

## 2021-06-03 NOTE — H&P ADULT - ATTENDING COMMENTS
66F w/HLD, Endometriosis presenting with SOB/OHPKINS and abdominal distention likely 2/2 malignant ascites from presumed ovarian primary given CT findings, w/u pending, s/p diagnostic/therapeutic tap. Also with b/l pleural effusions and orthopnea. Called ID fellow this AM, rec empiric tx for SBP given gram stain findings, would call for full consult this AM.

## 2021-06-03 NOTE — H&P ADULT - PROBLEM SELECTOR PLAN 2
Moderate volume ascites in CT. SAAG was 1.4 which in setting of imaging points to malignant ascites with portal hypertension. Likely 2/2 ovarian primary  - follow up cell count and fluid studies from paracentesis  - lasix as above  - onc consult in am   - gyn - onc consult in am   - check CEA, CA Likely causing worsening SOB and from translocation from malignant ascites.  continue to monitor  can trial lasix in am if continued sob

## 2021-06-03 NOTE — H&P ADULT - HISTORY OF PRESENT ILLNESS
PCP: Dr. Dr. Enamorado, last visit 5/1/18    S:     Payam Walls Jr presents to the clinic on today's date complaining of thick, brittle, painful toenails which are painful to palpation and ambulation in shoes.  The nails have not responded to conservative treatment and the patient doesn't want any oral medication or laser treatment at this time.  The patient reports painful nails that they can no longer self debride.    Past Medical Hx, Medications, Allergies, Social History  I have reviewed the patient's medications and allergies, past medical, surgical, social and family history, updating these as appropriate.  See Histories section of the EMR for a display of this information.    O:    Vascular: dorsalis pedis and posterior pulses are 2/4, CFT is less than 3 secs, extremities are warm.  Dermatologic:Nails are thickened, discolored, and elongated with subungual debris. no ecchymosis, no edema, no erythema, normal skin texture and turgor, no interdigital maceration  Neurologic: Monofilament testing reveals no loss of protective sensation. Vibratory sensation is present at the level of the 1st MPJ  Orthopedic: No significant gross deformities    A:     1. Onychomycosis x10 nails  2. Pain in feet bilaterally    P:     The patients feet were examined on today's date. The patient was instructed about good foot health.  I did debride nails x10 using sharp and mechanical debridement, reducing the diseased nail tissue to a level that alleviated the patient's symptoms and is medically safe for the patient once verbal consent was obtained. Patient tolerated this well. All questions were answered.                                 65 y/o female with PMH of HLD who presents to [facility] ED for SOB, couh, weakness and fatigue 2/2 ascites. Patient states she started began feeling increasing fatigue roughly 6 months ago. 4 weeks ago, she received her Moderna, and then she felt increase fatigue for 2-3 days. Shortly thereafter, 3 weeks ago, she noticed an increase in her abdominal size, which has becoming increasing large. Her abdominal size increased 'quickly', and causing decreased appetite, urinary output and bowel movements. Due to the increasing abdominal pressure, she reports experiencing bouts of urinary incontinece. She further developed epigastric pain. For the last week, she states a history of dry heaves and cough productive of phlegm. She states history of hernia, which has been worsened since the increasing size of her belly. Additionaly, she tells of burning pain in her right leg, and burning on urination. She denies chest pain    In the Ed the patient was noted to have abdominal distention and paracentesis performed draining 2.5L. CT CAP notable for ascites and peritoneal carcinomatosis, as well as enlarged pelvic masses.    67 y/o female with PMH of HLD who presents to [facility] ED for SOB, couh, weakness and fatigue 2/2 ascites. Patient states she started began feeling increasing fatigue roughly 6 months ago. 4 weeks ago, she received her 1st COVID Moderna vaccination, and then she felt increase fatigue for 2-3 days. Shortly thereafter, 3 weeks ago, she noticed an increase in her abdominal size, which has becoming increasing large. Her abdominal size increased 'quickly', and causing decreased appetite, urinary output and bowel movements. Due to the increasing abdominal pressure, she reports experiencing bouts of urinary incontinence. She further developed epigastric pain. For the last week, she states a history of dry heaves and cough productive of clear phlegm. She states history of hernia, which has been worsened since the increasing size of her belly. Additionally, she tells of burning pain in her right leg, and burning on urination. She denies chest pain    In the Ed the patient was noted to have abdominal distention and paracentesis performed draining 2.5L. CT CAP notable for ascites and peritoneal carcinomatosis, as well as enlarged pelvic masses.    67 y/o female with PMH of HLD who presents to [facility] ED for SOB, couh, weakness and fatigue 2/2 ascites. Patient states she started began feeling increasing fatigue roughly 6 months ago. 4 weeks ago, she received her 1st COVID Moderna vaccination, and then she felt increase fatigue for 2-3 days. Shortly thereafter, 3 weeks ago, she noticed an increase in her abdominal size, which has becoming increasing large. Her abdominal size increased 'quickly', and causing decreased appetite, urinary output and bowel movements. Due to the increasing abdominal pressure, she reports experiencing bouts of urinary incontinence. She further developed epigastric pain. For the last week, she states a history of dry heaves and cough productive of clear phlegm. She states history of hernia, which has been worsened since the increasing size of her belly. Additionally, she tells of burning pain in her right leg, and burning on urination. She denies chest pain.    Last pap smear 15 years ago reportedly wnl; Last mammogram 5 years ago reportedly wnl; No history of colonoscopy in past (pt refused), negative occult testing years ago.    In the Ed the patient was noted to have abdominal distention and paracentesis performed draining 2.5L. CT CAP notable for ascites and peritoneal carcinomatosis, as well as enlarged pelvic masses.

## 2021-06-03 NOTE — H&P ADULT - NSICDXFAMILYHX_GEN_ALL_CORE_FT
FAMILY HISTORY:  Father  Still living? Unknown  FHx: prostate cancer, Age at diagnosis: Age Unknown    Aunt  Still living? Unknown  FH: breast cancer, Age at diagnosis: Age Unknown

## 2021-06-03 NOTE — H&P ADULT - NSHPSOCIALHISTORY_GEN_ALL_CORE
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

## 2021-06-03 NOTE — H&P ADULT - PROBLEM SELECTOR PLAN 1
3 weeks of fatigue weakness sob associated with abdominal distention. CT chest without evidence of PE though patient tachycardic. Dyspnea likely 2/2 abdominal distention/ascites and pleural effusions likely from translocation of ascites. Improved after 2.5L paracentesis drain in the ED.   - continue with oxygen as needed  - Incentive spirometer, encourage ambulation   - Will trial one dose of lasix this evening for effusions 3 weeks of fatigue weakness sob associated with abdominal distention. CT chest without evidence of PE though patient tachycardic. Dyspnea likely 2/2 abdominal distention/ascites and pleural effusions likely from translocation of ascites. Improved after 2.5L paracentesis drain in the ED.   - continue with oxygen as needed  - Incentive spirometer, encourage ambulation   - can trial lasix in am if still sob Moderate volume ascites in CT. SAAG was 1.4 which in setting of imaging points to malignant ascites with portal hypertension. Likely 2/2 ovarian primary  - follow up cell count and fluid studies from paracentesis  - onc consult in am   - gyn - onc consult in am   - check CEA, CA    #Addendum  -Tap w/GPC in pairs, ?contaminant, will treat with CTX for now (pending UA), and would call ID consult in AM

## 2021-06-03 NOTE — H&P ADULT - ASSESSMENT
65 yo F pmhx HLD, Endometriosis presenting with SOB and abdominal distention likely 2/2 malignant ascites from ovarian primary. Also with b/l pleural effusions. 65 yo F pmhx HLD, Endometriosis presenting with SOB and abdominal distention likely 2/2 malignant ascites from presumed ovarian primary. Also with b/l pleural effusions.

## 2021-06-03 NOTE — H&P ADULT - PROBLEM SELECTOR PLAN 4
Seen on imaging. Enlarged and heterogeneous appearance of the bilateral adnexa/ovarie  - gyn onc and onc consult in am  - pelvic US? Patient complaining of some mild dysuria associated with trouble urinating. sandoval placed in the ED w/5cc dark yellow urine output per RN notes  - will get urinalysis; cultures, abx pending UA

## 2021-06-03 NOTE — ED PROVIDER NOTE - CLINICAL SUMMARY MEDICAL DECISION MAKING FREE TEXT BOX
kayla: pt c/o 3 weeks of dyspnea, has not seen a regular doctor or a gyn doctor for years. pt notes dyspnea at rest, heart going quickly and swollen tense abd, also somewhat new.  pt denies any medical hx, denies medications, has a hx of alcohol use, although she says she stopped in december 2020.  pt notes decreased urinary stream  pt with temporal wasting, abd tense with reducible umbilical hernia, lung exam no wheezing, cor pos s1s2, no s3s4., no peripheral edema at ankles.    a: pt with apparent ascites, will us, pt could use ct, but may not be capable of laying down for the same without a tap first, will clearly need admission.  hr is 2' to ascites, and resp status may be as well.    I analyzed the monitor at least 2 different times greater than 10 minutes apart and the rhythm was unchanged and o2 sat >92.    Upon my evaluation, this patient had a high probability of imminent or life-threatening deterioration due to ascites/ dyspnea, which required my direct attention, intervention, and personal management.  The patient has a  medical condition that impairs one or more vital organ systems.  Frequent personal assessment and adjustment of medical interventions was performed.      I have personally provided 34 minutes of critical care time exclusive of time spent on separately billable procedures. Time includes review of laboratory data, radiology results, discussion with consultants, patient and family; monitoring for potential decompensation, as well as time spent retrieving data and reviewing the chart and documenting the visit. Interventions were performed as documented above.

## 2021-06-03 NOTE — H&P ADULT - PROBLEM SELECTOR PLAN 5
Patient complaining of some mild dysuria associated with trouble urinating. sandoval placed in the Ed because of abdominal pressure causing incontinence.   - will get urinalysis; cultures. Afterwards start ceftriaxone Likely due to pleural effusions, ascites.   - tsh wnl   - continue to monitor

## 2021-06-03 NOTE — ED PROVIDER NOTE - OBJECTIVE STATEMENT
65 yo female with unremarkable pmhx p/w 3 weeks of abdominal distension and SOB. States she began to have increased abdominal swelling and pain, with SOB laying flat. Has not seen doctor in years. Came to ED for further evaluation. Has had h/o alcohol abuse, stopped drinking 12/2020.    Denies fevers, rash, LOC, CP, N/V/D

## 2021-06-03 NOTE — H&P ADULT - NSHPLABSRESULTS_GEN_ALL_CORE
15.0   8.96  )-----------( 589      ( 03 Jun 2021 13:38 )             44.8       06-03    131<L>  |  91<L>  |  25<H>  ----------------------------<  122<H>  3.8   |  22  |  0.92    Ca    10.4      03 Jun 2021 13:38    TPro  6.6  /  Alb  3.6  /  TBili  0.4  /  DBili  x   /  AST  27  /  ALT  12  /  AlkPhos  106  06-03        Protein Total, Fluid: 3.7: Lactate Dehydrogenase, Fluid: 473Glucose, Fluid: 110:Albumin, Fluid: 2.2:       `< from: CT Angio Chest PE Protocol w/ IV Cont (06.03.21 @ 19:06) >    FINDINGS:  CHEST:    PULMONARY ARTERY: No pulmonary embolus in the main, left and right pulmonary arteries. There is suboptimal opacification of the bilateral lower lobe pulmonary arteries, however there is no evidence of definitive filling defect and the atelectatic lower lobes opacify enhanced homogeneously.  LUNGS AND LARGE AIRWAYS: Patent central airways. Near-complete passive atelectasis of the bilateral lower lobes.  PLEURA: Moderate to large bilateral pleural effusions, extending to the apices. No evidence of pneumothorax.  VESSELS: Atherosclerotic changes.  HEART: Heart size is normal. No pericardial effusion.  MEDIASTINUM AND TENNILLE: No lymphadenopathy.  CHEST WALL AND LOWER NECK: Cardiophrenic lymphadenopathy. Small coarse calcifications in the right thyroid lobe.    ABDOMEN AND PELVIS:  LIVER: Tiny subcentimeter hypodense focus in the left hepatic lobe that is too small to characterize.  BILE DUCTS: Normal caliber.  GALLBLADDER: Cholelithiasis.  SPLEEN: Within normal limits.  PANCREAS: Within normal limits.  ADRENALS: Within normal limits.  KIDNEYS/URETERS: No hydronephrosis. Symmetric enhancement. Tiny right upper pole hypodensefocus that is too small to characterize.    BLADDER: Collapsed around a Cherry catheter with associated small foci of intraluminal air.  REPRODUCTIVE ORGANS: Enlarged and heterogeneous appearance of the bilateral adnexa/ovaries.    BOWEL: Innumerable peritoneal implants along the bowel. No bowel obstruction. Colonic diverticulosis.  PERITONEUM: Peritoneal carcinomatosis. Omental caking. Moderate volume ascites.  VESSELS: Within normal limits.  RETROPERITONEUM/LYMPH NODES: Retroperitoneal lymphadenopathy.  ABDOMINAL WALL: Umbilical and supraumbilical hernias containing peritoneal implants.  BONES: Degenerative changes.    IMPRESSION:  No pulmonary embolus in the main, left and right pulmonary arteries. Suboptimal evaluation of the bilateral lower lobe pulmonary arteries, however no definitive evidence of pulmonary embolism and homogeneous enhancement of the atelectatic lower lobes.    Moderate to large layering bilateral pleural effusions with near-complete atelectasis of the bilateral lower lobes.    Enlarged and heterogeneous appearance of the bilateral adnexa/ovaries, for which ovarian-based neoplasm should be considered.    Peritoneal carcinomatosis. Retroperitoneal and cardiophrenic adenopathy. Moderate volume malignant ascites.    < end of copied text > 15.0   8.96  )-----------( 589      ( 03 Jun 2021 13:38 )             44.8     06-03    131<L>  |  91<L>  |  25<H>  ----------------------------<  122<H>  3.8   |  22  |  0.92    Ca    10.4      03 Jun 2021 13:38    TPro  6.6  /  Alb  3.6  /  TBili  0.4  /  DBili  x   /  AST  27  /  ALT  12  /  AlkPhos  106  06-03    Troponin T, High Sensitivity Result: 17 ng/L (06.03.21 @ 19:15)  Troponin T, High Sensitivity Result: 22 ng/L (06.03.21 @ 13:38)    Serum Pro-Brain Natriuretic Peptide: 572 pg/mL (06.03.21 @ 13:38)    Thyroid Stimulating Hormone, Serum: 0.80 uIU/mL (06.03.21 @ 13:38)    HIV-1/2 Combo Result: Nonreact (06.03.21 @ 13:38)    COVID negative    13:38 - VBG - pH: 7.41  | pCO2: 39    | pO2: 40    | Lactate: 2.6          Protein Total, Fluid: 3.7 g/dL (06.03.21 @ 17:44)  Lactate Dehydrogenase, Fluid: 473 U/L (06.03.21 @ 17:44)  Glucose, Fluid: 110 mg/dL (06.03.21 @ 17:44)  Albumin, Fluid: 2.2 g/dL (06.03.21 @ 17:44)    < from: CT Angio Chest PE Protocol w/ IV Cont (06.03.21 @ 19:06) >/CT abd/pelvis w/IV cont  FINDINGS:  CHEST:   PULMONARY ARTERY: No pulmonary embolus in the main, left and right pulmonary arteries. There is suboptimal opacification of the bilateral lower lobe pulmonary arteries, however there is no evidence of definitive filling defect and the atelectatic lower lobes opacify enhanced homogeneously.  LUNGS AND LARGE AIRWAYS: Patent central airways. Near-complete passive atelectasis of the bilateral lower lobes.  PLEURA: Moderate to large bilateral pleural effusions, extending to the apices. No evidence of pneumothorax.  VESSELS: Atherosclerotic changes.  HEART: Heart size is normal. No pericardial effusion.  MEDIASTINUM AND TENNILLE: No lymphadenopathy.  CHEST WALL AND LOWER NECK: Cardiophrenic lymphadenopathy. Small coarse calcifications in the right thyroid lobe.  ABDOMEN AND PELVIS:  LIVER: Tiny subcentimeter hypodense focus in the left hepatic lobe that is too small to characterize.  BILE DUCTS: Normal caliber.  GALLBLADDER: Cholelithiasis.  SPLEEN: Within normal limits.  PANCREAS: Within normal limits.  ADRENALS: Within normal limits.  KIDNEYS/URETERS: No hydronephrosis. Symmetric enhancement. Tiny right upper pole hypodensefocus that is too small to characterize.  BLADDER: Collapsed around a Cherry catheter with associated small foci of intraluminal air.  REPRODUCTIVE ORGANS: Enlarged and heterogeneous appearance of the bilateral adnexa/ovaries.  BOWEL: Innumerable peritoneal implants along the bowel. No bowel obstruction. Colonic diverticulosis.  PERITONEUM: Peritoneal carcinomatosis. Omental caking. Moderate volume ascites.  VESSELS: Within normal limits.  RETROPERITONEUM/LYMPH NODES: Retroperitoneal lymphadenopathy.  ABDOMINAL WALL: Umbilical and supraumbilical hernias containing peritoneal implants.  BONES: Degenerative changes.  IMPRESSION: No pulmonary embolus in the main, left and right pulmonary arteries. Suboptimal evaluation of the bilateral lower lobe pulmonary arteries, however no definitive evidence of pulmonary embolism and homogeneous enhancement of the atelectatic lower lobes.  Moderate to large layering bilateral pleural effusions with near-complete atelectasis of the bilateral lower lobes. Enlarged and heterogeneous appearance of the bilateral adnexa/ovaries, for which ovarian-based neoplasm should be considered. Peritoneal carcinomatosis. Retroperitoneal and cardiophrenic adenopathy. Moderate volume malignant ascites.  < end of copied text >    CXR reviewed    EKG personally reviewed - 139bpm sinus tach, short MT, QTc 426ms

## 2021-06-03 NOTE — ED ADULT NURSE NOTE - OBJECTIVE STATEMENT
Pt arriving to room 2 A&OX4 ambulatory at baseline c/o worsening SOB x 3 weeks. Pt denies any PMHx but has not seen a PCP in years. Pt appears tachypneic, O2 sat 96% on RA. Pt placed on 4L NC for comfort, O2 sat 100%. Pt appears less tachypneic on O2, verbalizing partial improvement in SOB. Sinus tachy on monitor HR 130s. MD Griffin made aware. Abdomen appears distended. No swelling noted to BLE. Skin intact. Pt denies CP, N/V, abdominal pain, daily alcohol use. Pt endorsing decreased urine output with episodes of incontinence. 14 Fr Cherry catheter placed as ordered. ~5ml dark yellow urine output noted. 18g IV placed in RAC. Labs drawn and sent. MD at bedside, will continue to monitor.

## 2021-06-03 NOTE — ED ADULT NURSE REASSESSMENT NOTE - NS ED NURSE REASSESS COMMENT FT1
Received report from june RN. Pt. a&ox4, denies any complaints at this time. MD Page at bedside made aware of pt.'s VS. Pt. denies any chest pain, SOB, palpitations. Awaiting further orders. Will continue to monitor.

## 2021-06-03 NOTE — PATIENT PROFILE ADULT - 
ADDITIONAL INFORMATION
Pt states she took first shot, then before she could get the second shot she became symptomatic with urine retention, difficulty breathing

## 2021-06-03 NOTE — H&P ADULT - NSHPREVIEWOFSYSTEMS_GEN_ALL_CORE
REVIEW OF SYSTEMS:    CONSTITUTIONAL: No weakness, fevers or chills  EYES/ENT: No visual changes;  No vertigo or throat pain   NECK: No pain or stiffness  RESPIRATORY: + cough no wheezing, hemoptysis; + shortness of breath  CARDIOVASCULAR: No chest pain or palpitations  GASTROINTESTINAL: No abdominal or epigastric pain. No nausea, vomiting, or hematemesis; No diarrhea or constipation. No melena or hematochezia. +urinary and stool incontinence.   GENITOURINARY: No dysuria, frequency or hematuria  NEUROLOGICAL: No numbness or weakness  All other review of systems is negative unless indicated above. REVIEW OF SYSTEMS:    CONSTITUTIONAL: (+) weakness, No fevers or chills  EYES/ENT: No visual changes; No vertigo or throat pain   NECK: No pain or stiffness  RESPIRATORY: + cough no wheezing; No hemoptysis; + shortness of breath/HOPKINS  CARDIOVASCULAR: No chest pain or palpitations; No LE edema; (+)2 pillow orthopnea  GASTROINTESTINAL: No abdominal or epigastric pain. No nausea, (+) dry heaves; No vomiting, or hematemesis; No diarrhea or constipation. No melena or hematochezia. No stool incontinence.   GENITOURINARY: No dysuria, frequency or hematuria; (+)weak urinary stream/"dribbling"; has had one episode of urinary incontinence 1x/week when getting up from bed, otherwise no reported incontinence  NEUROLOGICAL: No numbness, paresthesias or weakness; (+)syncope  MSK: ambulates with walker, no falls  All other review of systems is negative unless indicated above. REVIEW OF SYSTEMS:    CONSTITUTIONAL: (+) weakness, No fevers or chills  EYES/ENT: No visual changes; No vertigo or throat pain   NECK: No pain or stiffness  RESPIRATORY: + cough no wheezing; No hemoptysis; + shortness of breath/HOPKINS  CARDIOVASCULAR: No chest pain or palpitations; No LE edema; (+)2 pillow orthopnea  GASTROINTESTINAL: No abdominal or epigastric pain. No nausea, (+) dry heaves; No vomiting, or hematemesis; No diarrhea or constipation. No melena or hematochezia. No stool incontinence.   GENITOURINARY: No dysuria, frequency or hematuria; (+)weak urinary stream/"dribbling"; has had one episode of urinary incontinence 1x/week when getting up from bed, otherwise no reported incontinence  NEUROLOGICAL: No numbness, paresthesias or weakness; No syncope; No saddle anesthesia  MSK: ambulates with walker, no falls  All other review of systems is negative unless indicated above.

## 2021-06-03 NOTE — ED PROVIDER NOTE - PROGRESS NOTE DETAILS
Neelam Guzmán PGY3  US guided sterile paracentesis performed, 2.5L fluid removed, sending fluid off for labs. pending CT abd, will then admit. patient feels improved sx. Shana Page, resident MD: CT reveals enlarged and heterogeneous appearance of the bilateral adnexa/ovaries and peritoneal carcinomatosis which is likely the cause of ascites. Discussed findings with pt. Will admit pt for further oncologic evaluation and management of symptomatic ascites.

## 2021-06-03 NOTE — ED ADULT NURSE REASSESSMENT NOTE - NS ED NURSE REASSESS COMMENT FT1
Pt at baseline mental status, resting comfortably. Appears in NAD. Verbalizing improvement in symptoms, states "I can breathe." Pt remains sinus tachy on monitor. Denies CP. Will continue to monitor.

## 2021-06-03 NOTE — ED ADULT NURSE REASSESSMENT NOTE - NS ED NURSE REASSESS COMMENT FT1
Report given to [facility] RN. Patient denies any complaints at this time. ED Tech transporting patient to [facility].

## 2021-06-03 NOTE — H&P ADULT - NSHPPHYSICALEXAM_GEN_ALL_CORE
Vital Signs Last 24 Hrs  T(C): 36.1 (03 Jun 2021 16:55), Max: 36.6 (03 Jun 2021 12:12)  T(F): 96.9 (03 Jun 2021 16:55), Max: 97.8 (03 Jun 2021 12:12)  HR: 124 (03 Jun 2021 21:13) (121 - 155)  BP: 110/75 (03 Jun 2021 21:13) (110/75 - 146/95)  BP(mean): --  RR: 22 (03 Jun 2021 21:13) (22 - 25)  SpO2: 97% (03 Jun 2021 21:13) (94% - 98%)    PHYSICAL EXAM:  GENERAL: NAD, lying in bed comfortably  HEAD:  Atraumatic, Normocephalic  EYES: EOMI, PERRLA, conjunctiva and sclera clear  ENT: Moist mucous membranes  NECK: Supple, No JVD  CHEST/LUNG: Clear to auscultation bilaterally; decreased breath sounds b/l with crackles at base  HEART: tachycardic, regular rhythm, no mrg   ABDOMEN: Bowel sounds present diffusely distended, typmanic, no fluid wave, nontender, periumbilical hernia   EXTREMITIES:  2+ Peripheral Pulses, brisk capillary refill. No clubbing, cyanosis, or edema  NERVOUS SYSTEM:  Alert & Oriented X3, speech clear. No deficits   MSK: FROM all 4 extremities, full and equal strength  SKIN: No rashes or lesions Vital Signs Last 24 Hrs  T(C): 36.1 (03 Jun 2021 16:55), Max: 36.6 (03 Jun 2021 12:12)  T(F): 96.9 (03 Jun 2021 16:55), Max: 97.8 (03 Jun 2021 12:12)  HR: 124 (03 Jun 2021 21:13) (121 - 155)  BP: 110/75 (03 Jun 2021 21:13) (110/75 - 146/95)  BP(mean): --  RR: 22 (03 Jun 2021 21:13) (22 - 25)  SpO2: 97% (03 Jun 2021 21:13) (94% - 98%)    PHYSICAL EXAM:  GENERAL: NAD, lying in bed comfortably  HEAD:  Atraumatic, Normocephalic  EYES: EOMI, PERRLA, conjunctiva and sclera clear  ENT: Moist mucous membranes  NECK: Supple, No JVD  CHEST/LUNG: Clear to auscultation bilaterally; decreased breath sounds b/l with crackles at base [attending exam - tachypneic with talking, decreased LS at bases; clear at apices   HEART: tachycardic, regular rhythm, no mrg, S1S2  ABDOMEN: Bowel sounds present diffusely distended, tympanic, no fluid wave, nontender, periumbilical hernia   EXTREMITIES:  2+ Peripheral Pulses, brisk capillary refill. No clubbing, cyanosis, or edema  NERVOUS SYSTEM:  Alert & Oriented X3, speech clear. No deficits; 4+/5 strength b/l LE; 5/5 strength b/l UE  MSK: FROM all 4 extremities, full and equal strength  SKIN: No rashes or lesions

## 2021-06-03 NOTE — ED ADULT NURSE NOTE - INTERVENTIONS DEFINITIONS
[place] to call system/Call bell, personal items and telephone within reach/Instruct patient to call for assistance/Non-slip footwear when patient is off stretcher/Physically safe environment: no spills, clutter or unnecessary equipment/Stretcher in lowest position, wheels locked, appropriate side rails in place/Monitor gait and stability/Monitor for mental status changes and reorient to person, place, and time

## 2021-06-03 NOTE — ED PROVIDER NOTE - ATTENDING CONTRIBUTION TO CARE
kayla: pt c/o 3 weeks of dyspnea, has not seen a regular doctor or a gyn doctor for years. pt notes dyspnea at rest, heart going quickly and swollen tense abd, also somewhat new.  pt denies any medical hx, denies medications, has a hx of alcohol use, although she says she stopped in december 2020.  pt notes decreased urinary stream  pt with temporal wasting, abd tense with reducible umbilical hernia, lung exam no wheezing, cor pos s1s2, no s3s4., no peripheral edema at ankles.    a: pt with apparent ascites, will us, pt could use ct, but may not be capable of laying down for the same without a tap first, will clearly need admission.  hr is 2' to ascites, and resp status may be as well.    I analyzed the monitor at least 2 different times greater than 10 minutes apart and the rhythm was unchanged and o2 sat >92.    Upon my evaluation, this patient had a high probability of imminent or life-threatening deterioration due to ascites/ dyspnea, which required my direct attention, intervention, and personal management.  The patient has a  medical condition that impairs one or more vital organ systems.  Frequent personal assessment and adjustment of medical interventions was performed.      I have personally provided 34 minutes of critical care time exclusive of time spent on separately billable procedures. Time includes review of laboratory data, radiology results, discussion with consultants, patient and family; monitoring for potential decompensation, as well as time spent retrieving data and reviewing the chart and documenting the visit. Interventions were performed as documented above.    I performed a history and physical exam of the patient and discussed their management with the resident and /or advanced care provider. I reviewed the resident and /or ACP's note and agree with the documented findings and plan of care. My medical decison making and observations are found above.

## 2021-06-03 NOTE — H&P ADULT - PROBLEM SELECTOR PLAN 6
Likely due to cancer diagnosis, ascites.   - will check tsh  - continue to monitir Likely due to cancer diagnosis, ascites.   - tsh wnl   - continue to monitir Seen on imaging. Enlarged and heterogeneous appearance of the bilateral adnexa/ovaries  - gyn onc and onc consult in am

## 2021-06-03 NOTE — PATIENT PROFILE ADULT - VISION (WITH CORRECTIVE LENSES IF THE PATIENT USUALLY WEARS THEM):
Pt wears reading glasses/Normal vision: sees adequately in most situations; can see medication labels, newsprint

## 2021-06-04 NOTE — PROGRESS NOTE ADULT - ATTENDING COMMENTS
66 y.o. F w/ a hx of endometriosis, HLD who presents with abdominal distension, pain, dysuria, found to have new ascites and peritoneal carcinomatosis.     # Peritoneal carcinomatosis w/ ascites: S/p paracentesis w/ 2.5L fluid removal, not sent for cell count. Gram stain + GPC in pairs, cont CTX. ID consulted. Cytology not sent, will try to add on if possible. C/s Gyn-onc, Oncology. Lasix IV 20 x 1. Abdomen appears distended though not firm, will likely need another paracentesis this admission.   # Dysuria: U/A bland appearing. Will try to collect UCx though pt already on abx for SBP.   # Dyspnea: Likely 2/ pleural effusions and abdominal distension. Trial Lasix IV 20 X 1 today.

## 2021-06-04 NOTE — PROGRESS NOTE ADULT - PROBLEM SELECTOR PLAN 6
Seen on imaging. Enlarged and heterogeneous appearance of the bilateral adnexa/ovaries  - gyn onc and onc consult in am Seen on imaging. Enlarged and heterogeneous appearance of the bilateral adnexa/ovaries  - gyn onc and onc consulted

## 2021-06-04 NOTE — CONSULT NOTE ADULT - ASSESSMENT
65 y/o female with PMH of HLD who presents to [facility] ED for SOB, cough, weakness and fatigue 2/2 ascites found to have imaging concerning new dx of metastatic malignancy.     #Ascites  #Concern for new metastatic malignancy- suspect GYN primary  CT chest/abd/pelvis reviewed as above. No PE detected. Noted to have moderate to large b/l pleural effusions with near complete atelectasis of b/l lower lobes, enlarged and heterogeneous appearance of the bilateral adnexa/ovaries, presence of peritoneal carcinomatosis with innumerable peritoneal implants. There is moderate ascites, RP and cardiophrenic adenopathy as well.   Patient is s/p paracentesis in ER with removal of 2.5L of fluid- gram stain is growing GPC in chains, currently on Ceftriaxone for concern of SBP, f/u ID recs  Please ensure cytology was sent with ascitic fluid post paracentesis- this will be useful in revealing malignant cells which would confirm metastatic disease and also may reveal cancer primary (gyn ca is high on differential)  If any repeat para are pursued this admission, please ensure cytology is sent as well.   Diagnosis and treatment planning pending above results.     Will follow closely.     Anali Ag MD  Hematology Oncology Fellow, PGY-5  [facility] Pager: [phone]/ [facility] Pager: [phone]

## 2021-06-04 NOTE — PROGRESS NOTE ADULT - PROBLEM SELECTOR PLAN 3
3 weeks of fatigue weakness sob associated with abdominal distention. CT chest without evidence of PE though patient tachycardic. Dyspnea likely 2/2 abdominal distention/ascites and pleural effusions likely from translocation of ascites. Improved after 2.5L paracentesis drain in the ED.   - continue with oxygen as needed  - Incentive spirometer, encourage ambulation   - can trial lasix in am if still sob  - pro-bnp wnl for age  reporting 2 pillow orthopnea, likely related to pleural effusions, pro-bnp wnl for age and no LE edema to suggest cardiac dysfunction; if shortness of breath persists, can check echo 3 weeks of fatigue weakness sob associated with abdominal distention. CT chest without evidence of PE though patient tachycardic. Dyspnea likely 2/2 abdominal distention/ascites and pleural effusions likely from translocation of ascites. Improved after 2.5L paracentesis drain in the ED.   - continue with oxygen as needed  - Incentive spirometer, encourage ambulation   - lasix   - pro-bnp wnl for age  reporting 2 pillow orthopnea, likely related to pleural effusions, pro-bnp wnl for age and no LE edema to suggest cardiac dysfunction; if shortness of breath persists, can check echo

## 2021-06-04 NOTE — CONSULT NOTE ADULT - ASSESSMENT
A/P: 67y/o G0 presented to the ED c/o worsening SOB and cough x1 month. CT Imaging significant for enlarged b/l adnexal masses, associated with ascites, peritoneal carcinomatosis, retroperitoneal lymphadenopathy, and pleural effusions, consistent with metastatic disease for an unknown primary. Given appearance of the adnexa, presentation is worrisome for a primary ovarian/fallopian tube/peritoneal cancer.     -Recommend tumor markers - CA-125, CEA, CA 19-9  -Agree with obtaining cytology studies of drained ascites fluid  -Consider IR-guided biopsy of omentum vs. supraumbilical/umbilical nodules in order to obtain a definitive tissue diagnosis      INCOMPLETE RECS, F/U FINAL RECS ONCE SEEN WITH ATTENDING     Jovita Downey PGY-3

## 2021-06-04 NOTE — CONSULT NOTE ADULT - SUBJECTIVE AND OBJECTIVE BOX
R3 GYN Onc Consult note    HPI:  65yo G_P_     Pt denies fever, chills, nausea, vomiting, diarrhea, headache, constipation, dizzyness, syncope, chest pain, palpitations, shortness of breath, dysuria, urgency, frequency, abdominal/pelvic pain, vaginal bleeding/discharge/odor/pain/itching, breast lumps/bumps, dyspareunia.    In ED today    Primary OB/GYN:    OBH:  GYNH: denies hx of fibroids, ov cysts, abnl paps, sti  PMSH:  MEDS: none  ALL: nkda  SOCH: denies t/e/d; safe at home  FAMH: denies fam hx of breast/uterine/ovarian/colon cancer    ROS: negative except per hpi    OBJECTIVE:    VITAL SIGNS:  Vital Signs Last 24 Hrs  T(C): 36.8 (04 Jun 2021 14:19), Max: 36.9 (03 Jun 2021 22:30)  T(F): 98.2 (04 Jun 2021 14:19), Max: 98.5 (03 Jun 2021 22:30)  HR: 123 (04 Jun 2021 14:19) (116 - 130)  BP: 105/77 (04 Jun 2021 14:19) (105/77 - 134/79)  BP(mean): --  RR: 18 (04 Jun 2021 14:19) (16 - 24)  SpO2: 98% (04 Jun 2021 14:19) (96% - 98%)  Height (cm): 157.5 (06-03-21 @ 23:41)  Weight (kg): 65 (06-03-21 @ 23:41)  BMI (kg/m2): 26.2 (06-03-21 @ 23:41)  BSA (m2): 1.66 (06-03-21 @ 23:41)  CAPILLARY BLOOD GLUCOSE            PHYSICAL EXAM:  GEN: NAD, A&Ox3  CV: RRR  LUNGS: CTAB  ABD: soft, NT, ND, +BS  SPECULUM:  PELVIC:  EXT: No LE edema/tenderness    LABS:                        13.0   8.88  )-----------( 471      ( 04 Jun 2021 07:05 )             39.0   baso x      eos x      imm gran x      lymph x      mono x      poly x                            15.0   8.96  )-----------( 589      ( 03 Jun 2021 13:38 )             44.8   baso 0.2    eos 0.1    imm gran 0.8    lymph 10.3   mono 10.3   poly 78.3       06-04    128<L>  |  90<L>  |  28<H>  ----------------------------<  97  3.8   |  19<L>  |  0.90    Ca    9.8      04 Jun 2021 07:05    TPro  5.6<L>  /  Alb  2.8<L>  /  TBili  0.4  /  DBili  x   /  AST  22  /  ALT  11  /  AlkPhos  89  06-04      Urinalysis Basic - ( 04 Jun 2021 07:03 )    Color: Yellow / Appearance: Clear / SG: >1.050 / pH: x  Gluc: x / Ketone: Large  / Bili: Small / Urobili: 3 mg/dL   Blood: x / Protein: 100 mg/dL / Nitrite: Negative   Leuk Esterase: Negative / RBC: MANY /HPF / WBC 1 /HPF   Sq Epi: x / Non Sq Epi: Occasional / Bacteria: Few        RADIOLOGY: R3 GYN Onc Consult note    HPI:  65y/o G0 presented to the ED c/o worsening SOB and cough x1 month. Patient reports experiencing increasing fatigue since 6 months ago. Shortly after receiving the COVID vaccine at the end of April, patient began noticing an increase in the size of her abdomen, associated with decreased appetite, urine output, and constipation. She subsequently developed "burning" in the epigastruim. Patient denies n/v, pelvic pain, vaginal bleeding, abnormal vaginal discharge. Paracentesis was performed in the ED and 2.5L of ascitic fluid was drained. CT of the chest demonstrated b/l pleural effusions, and CT of the abdomen/pelvis demonstrated enlarged b/l adnexa, ascites, peritoneal carcinomatosis with omental caking, and retroperitoneal lymphoadenopathy. GYN oncology consulted for a concern of a primary ovarian malignancy.    Patient last saw a gynecologist 15-20 years ago. Denies significant gynecologic history. She does not have a PCP (last saw one 4-5 years ago)    Pap smear - 15-20 yrs ago, wnl per patient  Mammogram - 5 years ago, wnl per patient   Colonoscopy - never performed    OBH: G0  GYNH: Menopause at 54 y/o, denies PMB; denies hx of fibroids, ov cysts, abnl paps  PM/SH: Repair of cleft palate as a child; HLD  MEDS: Denies  ALL: Sulfa drugs (hives)  SOCH: H/o alcohol abuse (per the chart), last used 12/2020; denies tobacco and drug use  FAMH: prostate cancer - father; breast cancer - aunt; denies 1st degree relatives with h/o breast/uterine/ovarian/colon cancer    ROS: negative except per hpi    OBJECTIVE:    VITAL SIGNS:  Vital Signs Last 24 Hrs  T(C): 36.8 (04 Jun 2021 14:19), Max: 36.9 (03 Jun 2021 22:30)  T(F): 98.2 (04 Jun 2021 14:19), Max: 98.5 (03 Jun 2021 22:30)  HR: 123 (04 Jun 2021 14:19) (116 - 130)  BP: 105/77 (04 Jun 2021 14:19) (105/77 - 134/79)  BP(mean): --  RR: 18 (04 Jun 2021 14:19) (16 - 24)  SpO2: 98% (04 Jun 2021 14:19) (96% - 98%)  Height (cm): 157.5 (06-03-21 @ 23:41)  Weight (kg): 65 (06-03-21 @ 23:41)  BMI (kg/m2): 26.2 (06-03-21 @ 23:41)  BSA (m2): 1.66 (06-03-21 @ 23:41)  CAPILLARY BLOOD GLUCOSE      PHYSICAL EXAM:  GEN: NAD, A&Ox3; pauses when speaking, appears to intermittently be short of breath  ABD: Soft, distended, dull to percussion, palpable reducible hernia just superior to the umbilicus  PELVIC: Deferred  EXT: No LE edema/tenderness      LABS:                        13.0   8.88  )-----------( 471      ( 04 Jun 2021 07:05 )             39.0   baso x      eos x      imm gran x      lymph x      mono x      poly x                            15.0   8.96  )-----------( 589      ( 03 Jun 2021 13:38 )             44.8   baso 0.2    eos 0.1    imm gran 0.8    lymph 10.3   mono 10.3   poly 78.3       06-04    128<L>  |  90<L>  |  28<H>  ----------------------------<  97  3.8   |  19<L>  |  0.90    Ca    9.8      04 Jun 2021 07:05    TPro  5.6<L>  /  Alb  2.8<L>  /  TBili  0.4  /  DBili  x   /  AST  22  /  ALT  11  /  AlkPhos  89  06-04      Urinalysis Basic - ( 04 Jun 2021 07:03 )    Color: Yellow / Appearance: Clear / SG: >1.050 / pH: x  Gluc: x / Ketone: Large  / Bili: Small / Urobili: 3 mg/dL   Blood: x / Protein: 100 mg/dL / Nitrite: Negative   Leuk Esterase: Negative / RBC: MANY /HPF / WBC 1 /HPF   Sq Epi: x / Non Sq Epi: Occasional / Bacteria: Few        RADIOLOGY:    EXAM:  CT ABDOMEN AND PELVIS IC    EXAM:  CT ANGIO CHEST PULM ART [facility]      PROCEDURE DATE:  Raul  3 2021     INTERPRETATION:  CLINICAL INFORMATION: Abdominal pain and distention, cough    COMPARISON: None.    CONTRAST/COMPLICATIONS:  IV Contrast: Omnipaque 350  90 cc administered   10 cc discarded  Oral Contrast: NONE  Complications: None reported at time of study completion    PROCEDURE:  CT Angiography of the Chest was performed followed by portal venous phase imaging of the Abdomen and Pelvis.  Sagittal and coronal reformats were performed as well as 3D (MIP) reconstructions.    FINDINGS:  CHEST:    PULMONARY ARTERY: No pulmonary embolus in the main, left and right pulmonary arteries. There is suboptimal opacification of the bilateral lower lobe pulmonary arteries, however there is no evidence of definitive filling defect and the atelectatic lower lobes opacify enhanced homogeneously.  LUNGS AND LARGE AIRWAYS: Patent central airways. Near-complete passive atelectasis of the bilateral lower lobes.  PLEURA: Moderate to large bilateral pleural effusions, extending to the apices. No evidence of pneumothorax.  VESSELS: Atherosclerotic changes.  HEART: Heart size is normal. No pericardial effusion.  MEDIASTINUM AND TENNILLE: No lymphadenopathy.  CHEST WALL AND LOWER NECK: Cardiophrenic lymphadenopathy. Small coarse calcifications in the right thyroid lobe.    ABDOMEN AND PELVIS:  LIVER: Tiny subcentimeter hypodense focus in the left hepatic lobe that is too small to characterize.  BILE DUCTS: Normal caliber.  GALLBLADDER: Cholelithiasis.  SPLEEN: Within normal limits.  PANCREAS: Within normal limits.  ADRENALS: Within normal limits.  KIDNEYS/URETERS: No hydronephrosis. Symmetric enhancement. Tiny right upper pole hypodense focus that is too small to characterize.    BLADDER: Collapsed around a Cherry catheter with associated small foci of intraluminal air.  REPRODUCTIVE ORGANS: Enlarged and heterogeneous appearance of the bilateral adnexa/ovaries.    BOWEL: Innumerable peritoneal implants along the bowel. No bowel obstruction. Colonic diverticulosis.  PERITONEUM: Peritoneal carcinomatosis. Omental caking. Moderate volume ascites.  VESSELS: Within normal limits.  RETROPERITONEUM/LYMPH NODES: Retroperitoneal lymphadenopathy.  ABDOMINAL WALL: Umbilical and supraumbilical hernias containing peritoneal implants.  BONES: Degenerative changes.    IMPRESSION:  No pulmonary embolus in the main, left and right pulmonary arteries. Suboptimal evaluation of the bilateral lower lobe pulmonary arteries, however no definitive evidence of pulmonary embolism and homogeneous enhancement of the atelectatic lower lobes.    Moderate to large layering bilateral pleural effusions with near-complete atelectasis of the bilateral lower lobes.    Enlarged and heterogeneous appearance of the bilateral adnexa/ovaries, for which ovarian-based neoplasm should be considered.    Peritoneal carcinomatosis. Retroperitoneal and cardiophrenic adenopathy. Moderate volume malignant ascites.

## 2021-06-04 NOTE — PROGRESS NOTE ADULT - PROBLEM SELECTOR PLAN 1
Moderate volume ascites in CT. SAAG was 1.4 which in setting of imaging points to malignant ascites with portal hypertension. Likely 2/2 ovarian primary  - follow up cell count and fluid studies from paracentesis  - onc consult in am   - gyn - onc consult in am   - check CEA, CA    #Addendum  -Tap w/GPC in pairs, ?contaminant, will treat with CTX for now (pending UA), and would call ID consult in AM Moderate volume ascites in CT. SAAG was 1.4 which in setting of imaging points to malignant ascites with portal hypertension. Likely 2/2 ovarian primary. Tap w/GPC in pairs, ?contaminant, will treat with CTX for now (pending UA), and would call ID consult in AM  - follow up cell count and fluid studies from paracentesis  -onc and gyn-onc are following  - check CEA, CA  -urine culture Moderate volume ascites in CT. SAAG was 1.4 which in setting of imaging points to malignant ascites with portal hypertension. Likely 2/2 ovarian primary. Tap w/GPC in pairs, ?contaminant, will treat with CTX for now (pending UA), and will call ID consult  - follow up cell count and fluid studies from paracentesis  -onc and gyn-onc are following  - check CEA, CA  -urine culture

## 2021-06-04 NOTE — CONSULT NOTE ADULT - SUBJECTIVE AND OBJECTIVE BOX
REASON FOR CONSULTATION: C/f new malignancy    HPI:     REVIEW OF SYSTEMS:    CONSTITUTIONAL: No weakness, fevers or chills  EYES/ENT: No visual changes;  No vertigo or throat pain   NECK: No pain or stiffness  RESPIRATORY: No cough, wheezing, hemoptysis; No shortness of breath  CARDIOVASCULAR: No chest pain or palpitations  GASTROINTESTINAL: No abdominal or epigastric pain. No nausea, vomiting, or hematemesis; No diarrhea or constipation. No melena or hematochezia.  GENITOURINARY: No dysuria, frequency or hematuria  NEUROLOGICAL: No numbness or weakness  SKIN: No itching, burning, rashes, or lesions   All other review of systems is negative unless indicated above.    Allergies    sulfa drugs (Unknown)    Intolerances        MEDICATIONS  (STANDING):  cefTRIAXone   IVPB 2000 milliGRAM(s) IV Intermittent every 24 hours  enoxaparin Injectable 40 milliGRAM(s) SubCutaneous daily    MEDICATIONS  (PRN):      Vital Signs Last 24 Hrs  T(C): 36.4 (04 Jun 2021 08:00), Max: 36.9 (03 Jun 2021 22:30)  T(F): 97.6 (04 Jun 2021 08:00), Max: 98.5 (03 Jun 2021 22:30)  HR: 116 (04 Jun 2021 08:00) (116 - 155)  BP: 128/75 (04 Jun 2021 08:00) (110/75 - 146/95)  BP(mean): --  RR: 17 (04 Jun 2021 08:00) (17 - 25)  SpO2: 98% (04 Jun 2021 08:00) (94% - 98%)    PHYSICAL EXAM:        LABS:                        13.0   8.88  )-----------( 471      ( 04 Jun 2021 07:05 )             39.0     06-04    128<L>  |  90<L>  |  28<H>  ----------------------------<  97  3.8   |  19<L>  |  0.90    Ca    9.8      04 Jun 2021 07:05    TPro  5.6<L>  /  Alb  2.8<L>  /  TBili  0.4  /  DBili  x   /  AST  22  /  ALT  11  /  AlkPhos  89  06-04    PT/INR - ( 03 Jun 2021 13:38 )   PT: 13.5 sec;   INR: 1.18 ratio         PTT - ( 03 Jun 2021 13:38 )  PTT:24.7 sec  Urinalysis Basic - ( 04 Jun 2021 07:03 )    Color: Yellow / Appearance: Clear / SG: >1.050 / pH: x  Gluc: x / Ketone: Large  / Bili: Small / Urobili: 3 mg/dL   Blood: x / Protein: 100 mg/dL / Nitrite: Negative   Leuk Esterase: Negative / RBC: MANY /HPF / WBC 1 /HPF   Sq Epi: x / Non Sq Epi: Occasional / Bacteria: Few            RADIOLOGY & ADDITIONAL STUDIES:    PATHOLOGY:     REASON FOR CONSULTATION: C/f new malignancy    HPI: 65 y/o female with PMH of HLD who presents to [facility] ED for SOB, cough, weakness and fatigue 2/2 ascites. Patient states she started began feeling increasing fatigue roughly 6 months ago. 4 weeks ago, she received her 1st COVID Moderna vaccination, and then she felt increase fatigue for 2-3 days. Shortly thereafter, 3 weeks ago, she noticed an increase in her abdominal size, which has becoming increasing large. Her abdominal size increased 'quickly', and causing decreased appetite, urinary output and bowel movements. Due to the increasing abdominal pressure, she reports experiencing bouts of urinary incontinence. She further developed epigastric pain. For the last week, she states a history of dry heaves and cough productive of clear phlegm. She states history of hernia, which has been worsened since the increasing size of her belly. Additionally, she tells of burning pain in her right leg, and burning on urination. She denies chest pain.    Last pap smear 15 years ago reportedly wnl; Last mammogram 5 years ago reportedly wnl; No history of colonoscopy in past (pt refused), negative occult testing years ago.    In the Ed the patient was noted to have abdominal distention and paracentesis performed draining 2.5L. CT CAP notable for ascites and peritoneal carcinomatosis, as well as enlarged pelvic masses.       REVIEW OF SYSTEMS:    CONSTITUTIONAL: + weakness, +Fatigue, no fevers or chills  EYES/ENT: No visual changes;  No vertigo or throat pain   NECK: No pain or stiffness  RESPIRATORY: + cough, + shortness of breath  CARDIOVASCULAR: No chest pain or palpitations  GASTROINTESTINAL: + abdominal distension. No nausea, vomiting, or hematemesis; No diarrhea or constipation. No melena or hematochezia.  GENITOURINARY: +urinary incontinence, dysuria  NEUROLOGICAL: No numbness or weakness  SKIN: No itching, burning, rashes, or lesions   All other review of systems is negative unless indicated above.    Allergies    sulfa drugs (Unknown)    Intolerances        MEDICATIONS  (STANDING):  cefTRIAXone   IVPB 2000 milliGRAM(s) IV Intermittent every 24 hours  enoxaparin Injectable 40 milliGRAM(s) SubCutaneous daily    MEDICATIONS  (PRN):      Vital Signs Last 24 Hrs  T(C): 36.4 (04 Jun 2021 08:00), Max: 36.9 (03 Jun 2021 22:30)  T(F): 97.6 (04 Jun 2021 08:00), Max: 98.5 (03 Jun 2021 22:30)  HR: 116 (04 Jun 2021 08:00) (116 - 155)  BP: 128/75 (04 Jun 2021 08:00) (110/75 - 146/95)  BP(mean): --  RR: 17 (04 Jun 2021 08:00) (17 - 25)  SpO2: 98% (04 Jun 2021 08:00) (94% - 98%)    PHYSICAL EXAM:        LABS:                        13.0   8.88  )-----------( 471      ( 04 Jun 2021 07:05 )             39.0     06-04    128<L>  |  90<L>  |  28<H>  ----------------------------<  97  3.8   |  19<L>  |  0.90    Ca    9.8      04 Jun 2021 07:05    TPro  5.6<L>  /  Alb  2.8<L>  /  TBili  0.4  /  DBili  x   /  AST  22  /  ALT  11  /  AlkPhos  89  06-04    PT/INR - ( 03 Jun 2021 13:38 )   PT: 13.5 sec;   INR: 1.18 ratio         PTT - ( 03 Jun 2021 13:38 )  PTT:24.7 sec  Urinalysis Basic - ( 04 Jun 2021 07:03 )    Color: Yellow / Appearance: Clear / SG: >1.050 / pH: x  Gluc: x / Ketone: Large  / Bili: Small / Urobili: 3 mg/dL   Blood: x / Protein: 100 mg/dL / Nitrite: Negative   Leuk Esterase: Negative / RBC: MANY /HPF / WBC 1 /HPF   Sq Epi: x / Non Sq Epi: Occasional / Bacteria: Few            RADIOLOGY & ADDITIONAL STUDIES:    < from: CT Abdomen and Pelvis w/ IV Cont (06.03.21 @ 19:07) >    EXAM:  CT ABDOMEN AND PELVIS IC      EXAM:  CT ANGIO CHEST PULM ART WAWIC        PROCEDURE DATE:  Raul  3 2021         INTERPRETATION:  CLINICAL INFORMATION: Abdominal pain and distention, cough    COMPARISON: None.    CONTRAST/COMPLICATIONS:  IV Contrast: Omnipaque 350  90 cc administered   10 cc discarded  Oral Contrast: NONE  Complications: None reported at time of study completion    PROCEDURE:  CT Angiography of the Chest was performed followed by portal venous phase imaging of the Abdomen and Pelvis.  Sagittal and coronal reformats were performed as well as 3D (MIP) reconstructions.    FINDINGS:  CHEST:    PULMONARY ARTERY: No pulmonary embolus in the main, left and right pulmonary arteries. There is suboptimal opacification of the bilateral lower lobe pulmonary arteries, however there is no evidence of definitive filling defect and the atelectatic lower lobes opacify enhanced homogeneously.  LUNGS AND LARGE AIRWAYS: Patent central airways. Near-complete passive atelectasis of the bilateral lower lobes.  PLEURA: Moderate to large bilateral pleural effusions, extending to the apices. No evidence of pneumothorax.  VESSELS: Atherosclerotic changes.  HEART: Heart size is normal. No pericardial effusion.  MEDIASTINUM AND TENNILLE: No lymphadenopathy.  CHEST WALL AND LOWER NECK: Cardiophrenic lymphadenopathy. Small coarse calcifications in the right thyroid lobe.    ABDOMEN AND PELVIS:  LIVER: Tiny subcentimeter hypodense focus in the left hepatic lobe that is too small to characterize.  BILE DUCTS: Normal caliber.  GALLBLADDER: Cholelithiasis.  SPLEEN: Within normal limits.  PANCREAS: Within normal limits.  ADRENALS: Within normal limits.  KIDNEYS/URETERS: No hydronephrosis. Symmetric enhancement. Tiny right upper pole hypodensefocus that is too small to characterize.    BLADDER: Collapsed around a Cherry catheter with associated small foci of intraluminal air.  REPRODUCTIVE ORGANS: Enlarged and heterogeneous appearance of the bilateral adnexa/ovaries.    BOWEL: Innumerable peritoneal implants along the bowel. No bowel obstruction. Colonic diverticulosis.  PERITONEUM: Peritoneal carcinomatosis. Omental caking. Moderate volume ascites.  VESSELS: Within normal limits.  RETROPERITONEUM/LYMPH NODES: Retroperitoneal lymphadenopathy.  ABDOMINAL WALL: Umbilical and supraumbilical hernias containing peritoneal implants.  BONES: Degenerative changes.    IMPRESSION:  No pulmonary embolus in the main, left and right pulmonary arteries. Suboptimal evaluation of the bilateral lower lobe pulmonary arteries, however no definitive evidence of pulmonary embolism and homogeneous enhancement of the atelectatic lower lobes.    Moderate to large layering bilateral pleural effusions with near-complete atelectasis of the bilateral lower lobes.    Enlarged and heterogeneous appearance of the bilateral adnexa/ovaries, for which ovarian-based neoplasm should be considered.    Peritoneal carcinomatosis. Retroperitoneal and cardiophrenic adenopathy. Moderate volume malignant ascites.            CHERYL OCHOA MD; Resident Radiology  This document has been electronically signed.  MAHNAZ HASSAN M.D., ATTENDING RADIOLOGIST  This document has been electronically signed. Raul  3 2021  7:59PM    < end of copied text >      PATHOLOGY:    PENDING     REASON FOR CONSULTATION: C/f new malignancy    HPI: 67 y/o female with PMH of HLD who presents to [facility] ED for SOB, cough, weakness and fatigue 2/2 ascites. Patient states she started began feeling increasing fatigue roughly 6 months ago. 4 weeks ago, she received her 1st COVID Moderna vaccination, and then she felt increase fatigue for 2-3 days. Patient never received her 2nd vaccine shot. Shortly thereafter, 3 weeks ago, she noticed an increase in her abdominal size, which has becoming increasing large. Her abdominal size increased 'quickly', and causing decreased appetite, urinary output and bowel movements. Due to the increasing abdominal pressure, she reports experiencing bouts of urinary incontinence. She further developed epigastric pain. For the last week, she states a history of dry heaves and cough productive of clear phlegm. She states history of hernia, which has been worsened since the increasing size of her belly. Additionally, she tells of burning pain in her right leg, and burning on urination. She denies chest pain.    Last pap smear 15 years ago reportedly wnl; Last mammogram 5 years ago reportedly wnl; No history of colonoscopy in past (pt refused), negative occult testing years ago.    In the Ed the patient was noted to have abdominal distention and paracentesis performed draining 2.5L. CT CAP notable for ascites and peritoneal carcinomatosis, as well as enlarged pelvic masses.       REVIEW OF SYSTEMS:    CONSTITUTIONAL: + weakness, +Fatigue, no fevers or chills  EYES/ENT: No visual changes;  No vertigo or throat pain   NECK: No pain or stiffness  RESPIRATORY: + cough, + shortness of breath  CARDIOVASCULAR: No chest pain or palpitations  GASTROINTESTINAL: + abdominal distension. No nausea, vomiting, or hematemesis; No diarrhea or constipation. No melena or hematochezia.  GENITOURINARY: +urinary incontinence, dysuria  NEUROLOGICAL: No numbness or weakness  SKIN: No itching, burning, rashes, or lesions   All other review of systems is negative unless indicated above.    Allergies    sulfa drugs (Unknown)    Intolerances        MEDICATIONS  (STANDING):  cefTRIAXone   IVPB 2000 milliGRAM(s) IV Intermittent every 24 hours  enoxaparin Injectable 40 milliGRAM(s) SubCutaneous daily    MEDICATIONS  (PRN):      Vital Signs Last 24 Hrs  T(C): 36.4 (04 Jun 2021 08:00), Max: 36.9 (03 Jun 2021 22:30)  T(F): 97.6 (04 Jun 2021 08:00), Max: 98.5 (03 Jun 2021 22:30)  HR: 116 (04 Jun 2021 08:00) (116 - 155)  BP: 128/75 (04 Jun 2021 08:00) (110/75 - 146/95)  BP(mean): --  RR: 17 (04 Jun 2021 08:00) (17 - 25)  SpO2: 98% (04 Jun 2021 08:00) (94% - 98%)    PHYSICAL EXAM:    GENERAL: NAD, well-groomed, well-developed  HEAD:  Atraumatic, Normocephalic  EYES: EOMI, PERRLA, conjunctiva and sclera clear  ENMT: No tonsillar erythema, exudates, or enlargement; Moist mucous membranes, Good dentition, No lesions  NECK: Supple, No JVD, Normal thyroid  CHEST/LUNG: Clear to percussion bilaterally; No rales, rhonchi, wheezing, or rubs  HEART: Regular rate and rhythm; No murmurs, rubs, or gallops  ABDOMEN: +firm, tense ascites, periumbilical hernia felt and unable to be reduced due to fluid. Nontender abdomen. +Bowel sounds present  VASCULAR:  2+ Peripheral Pulses, No clubbing, cyanosis, or edema  LYMPH: No lymphadenopathy noted  SKIN: No rashes or lesions  NERVOUS SYSTEM:  Alert & Oriented X3      LABS:                        13.0   8.88  )-----------( 471      ( 04 Jun 2021 07:05 )             39.0     06-04    128<L>  |  90<L>  |  28<H>  ----------------------------<  97  3.8   |  19<L>  |  0.90    Ca    9.8      04 Jun 2021 07:05    TPro  5.6<L>  /  Alb  2.8<L>  /  TBili  0.4  /  DBili  x   /  AST  22  /  ALT  11  /  AlkPhos  89  06-04    PT/INR - ( 03 Jun 2021 13:38 )   PT: 13.5 sec;   INR: 1.18 ratio         PTT - ( 03 Jun 2021 13:38 )  PTT:24.7 sec  Urinalysis Basic - ( 04 Jun 2021 07:03 )    Color: Yellow / Appearance: Clear / SG: >1.050 / pH: x  Gluc: x / Ketone: Large  / Bili: Small / Urobili: 3 mg/dL   Blood: x / Protein: 100 mg/dL / Nitrite: Negative   Leuk Esterase: Negative / RBC: MANY /HPF / WBC 1 /HPF   Sq Epi: x / Non Sq Epi: Occasional / Bacteria: Few            RADIOLOGY & ADDITIONAL STUDIES:    < from: CT Abdomen and Pelvis w/ IV Cont (06.03.21 @ 19:07) >    EXAM:  CT ABDOMEN AND PELVIS IC      EXAM:  CT ANGIO CHEST PULM ART WAWIC        PROCEDURE DATE:  Raul  3 2021         INTERPRETATION:  CLINICAL INFORMATION: Abdominal pain and distention, cough    COMPARISON: None.    CONTRAST/COMPLICATIONS:  IV Contrast: Omnipaque 350  90 cc administered   10 cc discarded  Oral Contrast: NONE  Complications: None reported at time of study completion    PROCEDURE:  CT Angiography of the Chest was performed followed by portal venous phase imaging of the Abdomen and Pelvis.  Sagittal and coronal reformats were performed as well as 3D (MIP) reconstructions.    FINDINGS:  CHEST:    PULMONARY ARTERY: No pulmonary embolus in the main, left and right pulmonary arteries. There is suboptimal opacification of the bilateral lower lobe pulmonary arteries, however there is no evidence of definitive filling defect and the atelectatic lower lobes opacify enhanced homogeneously.  LUNGS AND LARGE AIRWAYS: Patent central airways. Near-complete passive atelectasis of the bilateral lower lobes.  PLEURA: Moderate to large bilateral pleural effusions, extending to the apices. No evidence of pneumothorax.  VESSELS: Atherosclerotic changes.  HEART: Heart size is normal. No pericardial effusion.  MEDIASTINUM AND TENNILLE: No lymphadenopathy.  CHEST WALL AND LOWER NECK: Cardiophrenic lymphadenopathy. Small coarse calcifications in the right thyroid lobe.    ABDOMEN AND PELVIS:  LIVER: Tiny subcentimeter hypodense focus in the left hepatic lobe that is too small to characterize.  BILE DUCTS: Normal caliber.  GALLBLADDER: Cholelithiasis.  SPLEEN: Within normal limits.  PANCREAS: Within normal limits.  ADRENALS: Within normal limits.  KIDNEYS/URETERS: No hydronephrosis. Symmetric enhancement. Tiny right upper pole hypodensefocus that is too small to characterize.    BLADDER: Collapsed around a Cherry catheter with associated small foci of intraluminal air.  REPRODUCTIVE ORGANS: Enlarged and heterogeneous appearance of the bilateral adnexa/ovaries.    BOWEL: Innumerable peritoneal implants along the bowel. No bowel obstruction. Colonic diverticulosis.  PERITONEUM: Peritoneal carcinomatosis. Omental caking. Moderate volume ascites.  VESSELS: Within normal limits.  RETROPERITONEUM/LYMPH NODES: Retroperitoneal lymphadenopathy.  ABDOMINAL WALL: Umbilical and supraumbilical hernias containing peritoneal implants.  BONES: Degenerative changes.    IMPRESSION:  No pulmonary embolus in the main, left and right pulmonary arteries. Suboptimal evaluation of the bilateral lower lobe pulmonary arteries, however no definitive evidence of pulmonary embolism and homogeneous enhancement of the atelectatic lower lobes.    Moderate to large layering bilateral pleural effusions with near-complete atelectasis of the bilateral lower lobes.    Enlarged and heterogeneous appearance of the bilateral adnexa/ovaries, for which ovarian-based neoplasm should be considered.    Peritoneal carcinomatosis. Retroperitoneal and cardiophrenic adenopathy. Moderate volume malignant ascites.            CHERYL OCHOA MD; Resident Radiology  This document has been electronically signed.  MAHNAZ HASSAN M.D., ATTENDING RADIOLOGIST  This document has been electronically signed. Raul  3 2021  7:59PM    < end of copied text >      PATHOLOGY:    PENDING

## 2021-06-04 NOTE — PROGRESS NOTE ADULT - PROBLEM SELECTOR PLAN 4
Patient complaining of some mild dysuria associated with trouble urinating. sandoval placed in the ED w/5cc dark yellow urine output per RN notes  - will get urinalysis; cultures, abx pending UA Patient complaining of some mild dysuria associated with trouble urinating. sandoval placed in the ED w/5cc dark yellow urine output per RN notes  - Urine culture  -on ceftriaxone

## 2021-06-04 NOTE — PROGRESS NOTE ADULT - PROBLEM SELECTOR PLAN 2
Likely causing worsening SOB and from translocation from malignant ascites.  continue to monitor  can trial lasix in am if continued sob Likely causing worsening SOB and from translocation from malignant ascites.  continue to monitor  -lasix 20 mg IV

## 2021-06-04 NOTE — PROGRESS NOTE ADULT - SUBJECTIVE AND OBJECTIVE BOX
Detail Level: Detailed   Patient is a 66y old  Female who presents with a chief complaint of SOB (04 Jun 2021 06:57)      SUBJECTIVE / OVERNIGHT EVENTS:    ADDITIONAL REVIEW OF SYSTEMS:    MEDICATIONS  (STANDING):  enoxaparin Injectable 40 milliGRAM(s) SubCutaneous daily    MEDICATIONS  (PRN):      CAPILLARY BLOOD GLUCOSE        I&O's Summary      PHYSICAL EXAM:  Vital Signs Last 24 Hrs  T(C): 36.2 (04 Jun 2021 03:57), Max: 36.9 (03 Jun 2021 22:30)  T(F): 97.2 (04 Jun 2021 03:57), Max: 98.5 (03 Jun 2021 22:30)  HR: 117 (04 Jun 2021 03:57) (117 - 155)  BP: 125/77 (04 Jun 2021 03:57) (110/75 - 146/95)  BP(mean): --  RR: 20 (04 Jun 2021 03:57) (20 - 25)  SpO2: 97% (04 Jun 2021 03:57) (94% - 98%)    GENERAL: No acute distress, well-developed  HEAD:  Atraumatic, Normocephalic  EYES: EOMI, PERRLA, conjunctiva and sclera clear  NECK: Supple, no lymphadenopathy, no JVD  CHEST/LUNG: CTAB; No wheezes, rales, or rhonchi  HEART: Regular rate and rhythm; No murmurs, rubs, or gallops  ABDOMEN: Soft, non-tender, non-distended; normal bowel sounds, no organomegaly  EXTREMITIES:  2+ peripheral pulses b/l, No clubbing, cyanosis, or edema  NEUROLOGY: A&O x 3, no focal deficits  SKIN: No rashes or lesions    LABS:                        13.0   8.88  )-----------( 471      ( 04 Jun 2021 07:05 )             39.0     06-03    131<L>  |  91<L>  |  25<H>  ----------------------------<  122<H>  3.8   |  22  |  0.92    Ca    10.4      03 Jun 2021 13:38    TPro  6.6  /  Alb  3.6  /  TBili  0.4  /  DBili  x   /  AST  27  /  ALT  12  /  AlkPhos  106  06-03    PT/INR - ( 03 Jun 2021 13:38 )   PT: 13.5 sec;   INR: 1.18 ratio         PTT - ( 03 Jun 2021 13:38 )  PTT:24.7 sec          Culture - Body Fluid with Gram Stain (collected 03 Jun 2021 23:34)  Source: .Body Fluid Peritoneal Fluid  Gram Stain (04 Jun 2021 04:14):    polymorphonuclear leukocytes seen    Gram positive cocci in pairs seen    by cytocentrifuge        RADIOLOGY & ADDITIONAL TESTS:  Results Reviewed:   Imaging Personally Reviewed:  Electrocardiogram Personally Reviewed:    COORDINATION OF CARE:  Care Discussed with Consultants/Other Providers [Y/N]:  Prior or Outpatient Records Reviewed [Y/N]:     Patient is a 66y old  Female who presents with a chief complaint of SOB (04 Jun 2021 06:57)      SUBJECTIVE / OVERNIGHT EVENTS:  Still have abdominal discomfort, much improved after paracentesis. Improved SOB. No nausea, vomiting, diarrhea.     ADDITIONAL REVIEW OF SYSTEMS:    MEDICATIONS  (STANDING):  enoxaparin Injectable 40 milliGRAM(s) SubCutaneous daily    MEDICATIONS  (PRN):      CAPILLARY BLOOD GLUCOSE        I&O's Summary      PHYSICAL EXAM:  Vital Signs Last 24 Hrs  T(C): 36.2 (04 Jun 2021 03:57), Max: 36.9 (03 Jun 2021 22:30)  T(F): 97.2 (04 Jun 2021 03:57), Max: 98.5 (03 Jun 2021 22:30)  HR: 117 (04 Jun 2021 03:57) (117 - 155)  BP: 125/77 (04 Jun 2021 03:57) (110/75 - 146/95)  BP(mean): --  RR: 20 (04 Jun 2021 03:57) (20 - 25)  SpO2: 97% (04 Jun 2021 03:57) (94% - 98%)    GENERAL: No acute distress, well-developed  HEAD:  Atraumatic, Normocephalic  EYES: EOMI, PERRLA, conjunctiva and sclera clear  NECK: Supple, no lymphadenopathy, no JVD  CHEST/LUNG: CTAB; No wheezes, rales, or rhonchi  HEART: Regular rate and rhythm; No murmurs, rubs, or gallops  ABDOMEN: Soft, non-tender, non-distended; normal bowel sounds, no organomegaly  EXTREMITIES:  2+ peripheral pulses b/l, No clubbing, cyanosis, or edema  NEUROLOGY: A&O x 3, no focal deficits  SKIN: No rashes or lesions    LABS:                        13.0   8.88  )-----------( 471      ( 04 Jun 2021 07:05 )             39.0     06-03    131<L>  |  91<L>  |  25<H>  ----------------------------<  122<H>  3.8   |  22  |  0.92    Ca    10.4      03 Jun 2021 13:38    TPro  6.6  /  Alb  3.6  /  TBili  0.4  /  DBili  x   /  AST  27  /  ALT  12  /  AlkPhos  106  06-03    PT/INR - ( 03 Jun 2021 13:38 )   PT: 13.5 sec;   INR: 1.18 ratio         PTT - ( 03 Jun 2021 13:38 )  PTT:24.7 sec          Culture - Body Fluid with Gram Stain (collected 03 Jun 2021 23:34)  Source: .Body Fluid Peritoneal Fluid  Gram Stain (04 Jun 2021 04:14):    polymorphonuclear leukocytes seen    Gram positive cocci in pairs seen    by cytocentrifuge        RADIOLOGY & ADDITIONAL TESTS:  Results Reviewed:   Imaging Personally Reviewed:  Electrocardiogram Personally Reviewed:    COORDINATION OF CARE:  Care Discussed with Consultants/Other Providers [Y/N]:  Prior or Outpatient Records Reviewed [Y/N]:

## 2021-06-04 NOTE — CONSULT NOTE ADULT - SUBJECTIVE AND OBJECTIVE BOX
Patient is a 66y old  Female who presents with a chief complaint of SOB (04 Jun 2021 07:33)    HPI:  67 y/o female with PMH of HLD who presents to [facility] ED for SOB, couh, weakness and fatigue 2/2 ascites. Patient states she started began feeling increasing fatigue roughly 6 months ago. 4 weeks ago, she received her 1st COVID Moderna vaccination, and then she felt increase fatigue for 2-3 days. Shortly thereafter, 3 weeks ago, she noticed an increase in her abdominal size, which has becoming increasing large. Her abdominal size increased 'quickly', and causing decreased appetite, urinary output and bowel movements. Due to the increasing abdominal pressure, she reports experiencing bouts of urinary incontinence. She further developed epigastric pain. For the last week, she states a history of dry heaves and cough productive of clear phlegm. She states history of hernia, which has been worsened since the increasing size of her belly. Additionally, she tells of burning pain in her right leg, and burning on urination. She denies chest pain.    Last pap smear 15 years ago reportedly wnl; Last mammogram 5 years ago reportedly wnl; No history of colonoscopy in past (pt refused), negative occult testing years ago.    In the ED the patient was noted to have abdominal distention and paracentesis performed draining 2.5L. CT CAP notable for ascites and peritoneal carcinomatosis, as well as enlarged pelvic masses.    (03 Jun 2021 22:12)     prior hospital charts reviewed [ x ]  primary team notes reviewed [ x ]  other consultant notes reviewed [ x ]    PAST MEDICAL & SURGICAL HISTORY:  Hyperlipidemia    Endometriosis    H/O cleft lip  repair as a young child    Allergies  sulfa drugs (Pruritus; Rash)    ANTIMICROBIALS (past 90 days)  MEDICATIONS  (STANDING):  cefTRIAXone   IVPB   100 mL/Hr IV Intermittent (06-04-21 @ 12:02)    cefTRIAXone   IVPB 2000 every 24 hours    MEDICATIONSS: MEDICATIONS  (STANDING):  enoxaparin Injectable 40 daily  furosemide   Injectable 20 daily    SOCIAL HISTORY: No smoking hx. Has hx of being prior social drinker, quit 12/2020    FAMILY HISTORY:  FHx: prostate cancer (Father)    FH: breast cancer (Aunt)    REVIEW OF SYSTEMS:    CONSTITUTIONAL: (+) weakness, No fevers or chills  EYES/ENT: No visual changes; No vertigo or throat pain   NECK: No pain or stiffness  RESPIRATORY: + cough no wheezing; No hemoptysis; + shortness of breath/HOPKINS  CARDIOVASCULAR: No chest pain or palpitations; No LE edema; (+)2 pillow orthopnea  GASTROINTESTINAL: No abdominal or epigastric pain. No nausea, (+) dry heaves; No vomiting, or hematemesis; No diarrhea or constipation. No melena or hematochezia. No stool incontinence.   GENITOURINARY: No dysuria, frequency or hematuria; (+)weak urinary stream/"dribbling"; has had one episode of urinary incontinence 1x/week when getting up from bed, otherwise no reported incontinence  NEUROLOGICAL: No numbness, paresthesias or weakness; No syncope; No saddle anesthesia  MSK: ambulates with walker, no falls  All other review of systems is negative unless indicated above.    Vital Signs Last 24 Hrs  T(F): 97.8 (06-04-21 @ 12:00), Max: 98.5 (06-03-21 @ 22:30)  Vital Signs Last 24 Hrs  HR: 124 (06-04-21 @ 12:00) (116 - 130)  BP: 122/75 (06-04-21 @ 12:00) (110/75 - 134/79)  RR: 16 (06-04-21 @ 12:00)  SpO2: 97% (06-04-21 @ 12:00) (96% - 98%)  Wt(kg): --    GENERAL: No acute distress, well-developed  HEAD:  Atraumatic, Normocephalic  EYES: EOMI, PERRLA, conjunctiva and sclera clear  NECK: Supple, no lymphadenopathy, no JVD  CHEST/LUNG: CTAB; No wheezes, rales, or rhonchi  HEART: Regular rate and rhythm; No murmurs, rubs, or gallops  ABDOMEN: Soft, non-tender, non-distended; normal bowel sounds, no organomegaly  EXTREMITIES:  2+ peripheral pulses b/l, No clubbing, cyanosis, or edema  NEUROLOGY: A&O x 3, no focal deficits  SKIN: No rashes or lesions                        13.0   8.88  )-----------( 471      ( 04 Jun 2021 07:05 )             39.0   06-04    128<L>  |  90<L>  |  28<H>  ----------------------------<  97  3.8   |  19<L>  |  0.90    Ca    9.8      04 Jun 2021 07:05    TPro  5.6<L>  /  Alb  2.8<L>  /  TBili  0.4  /  DBili  x   /  AST  22  /  ALT  11  /  AlkPhos  89  06-04    Urinalysis Basic - ( 04 Jun 2021 07:03 )    Color: Yellow / Appearance: Clear / SG: >1.050 / pH: x  Gluc: x / Ketone: Large  / Bili: Small / Urobili: 3 mg/dL   Blood: x / Protein: 100 mg/dL / Nitrite: Negative   Leuk Esterase: Negative / RBC: MANY /HPF / WBC 1 /HPF   Sq Epi: x / Non Sq Epi: Occasional / Bacteria: Few    MICROBIOLOGY:  Culture - Body Fluid with Gram Stain (collected 03 Jun 2021 23:34)  Source: .Body Fluid Peritoneal Fluid  Gram Stain (04 Jun 2021 04:14):    polymorphonuclear leukocytes seen    Gram positive cocci in pairs seen    by cytocentrifuge    RADIOLOGY:  imaging below personally reviewed and agree with findings    < from: CT Abdomen and Pelvis w/ IV Cont (06.03.21 @ 19:07) >    EXAM:  CT ABDOMEN AND PELVIS IC      EXAM:  CT ANGIO CHEST PULM ART [facility]AWIC      PROCEDURE DATE:  Raul  3 2021     INTERPRETATION:  CLINICAL INFORMATION: Abdominal pain and distention, cough    COMPARISON: None.    CONTRAST/COMPLICATIONS:  IV Contrast: Omnipaque 350  90 cc administered   10 cc discarded  Oral Contrast: NONE  Complications: None reported at time of study completion    PROCEDURE:  CT Angiography of the Chest was performed followed by portal venous phase imaging of the Abdomen and Pelvis.  Sagittal and coronal reformats were performed as well as 3D (MIP) reconstructions.    FINDINGS:  CHEST:    PULMONARY ARTERY: No pulmonary embolus in the main, left and right pulmonary arteries. There is suboptimal opacification of the bilateral lower lobe pulmonary arteries, however there is no evidence of definitive filling defect and the atelectatic lower lobes opacify enhanced homogeneously.  LUNGS AND LARGE AIRWAYS: Patent central airways. Near-complete passive atelectasis of the bilateral lower lobes.  PLEURA: Moderate to large bilateral pleural effusions, extending to the apices. No evidence of pneumothorax.  VESSELS: Atherosclerotic changes.  HEART: Heart size is normal. No pericardial effusion.  MEDIASTINUM AND TENNILLE: No lymphadenopathy.  CHEST WALL AND LOWER NECK: Cardiophrenic lymphadenopathy. Small coarse calcifications in the right thyroid lobe.    ABDOMEN AND PELVIS:  LIVER: Tiny subcentimeter hypodense focus in the left hepatic lobe that is too small to characterize.  BILE DUCTS: Normal caliber.  GALLBLADDER: Cholelithiasis.  SPLEEN: Within normal limits.  PANCREAS: Within normal limits.  ADRENALS: Within normal limits.  KIDNEYS/URETERS: No hydronephrosis. Symmetric enhancement. Tiny right upper pole hypodensefocus that is too small to characterize.    BLADDER: Collapsed around a Cherry catheter with associated small foci of intraluminal air.  REPRODUCTIVE ORGANS: Enlarged and heterogeneous appearance of the bilateral adnexa/ovaries.    BOWEL: Innumerable peritoneal implants along the bowel. No bowel obstruction. Colonic diverticulosis.  PERITONEUM: Peritoneal carcinomatosis. Omental caking. Moderate volume ascites.  VESSELS: Within normal limits.  RETROPERITONEUM/LYMPH NODES: Retroperitoneal lymphadenopathy.  ABDOMINAL WALL: Umbilical and supraumbilical hernias containing peritoneal implants.  BONES: Degenerative changes.    IMPRESSION:  No pulmonary embolus in the main, left and right pulmonary arteries. Suboptimal evaluation of the bilateral lower lobe pulmonary arteries, however no definitive evidence of pulmonary embolism and homogeneous enhancement of the atelectatic lower lobes.    Moderate to large layering bilateral pleural effusions with near-complete atelectasis of the bilateral lower lobes.    Enlarged and heterogeneous appearance of the bilateral adnexa/ovaries, for which ovarian-based neoplasm should be considered.    Peritoneal carcinomatosis. Retroperitoneal and cardiophrenic adenopathy. Moderate volume malignant ascites.    CHERYL OCHOA MD; Resident Radiology  This document has been electronically signed.  MAHNAZ HASSAN M.D., ATTENDING RADIOLOGIST  This document has been electronically signed. Raul  3 2021  7:59PM    < end of copied text >   Patient is a 66y old  Female who presents with a chief complaint of SOB (04 Jun 2021 07:33)    HPI:  65 y/o female with PMH of HLD who presents [facility] ED for SOB, couh, weakness and fatigue 2/2 ascites. Patient states she started began feeling increasing fatigue roughly 6 months ago. 4 weeks ago, she received her 1st COVID Moderna vaccination, and then she felt increase fatigue for 2-3 days. Shortly thereafter, 3 weeks ago, she noticed an increase in her abdominal size, which has becoming increasing large. Her abdominal size increased 'quickly', and causing decreased appetite, urinary output and bowel movements. Due to the increasing abdominal pressure, she reports experiencing bouts of urinary incontinence. She further developed epigastric pain. For the last week, she states a history of dry heaves and cough productive of clear phlegm. She states history of hernia, which has been worsened since the increasing size of her belly. Additionally, she tells of burning pain in her right leg, and burning on urination. She denies chest pain.    Last pap smear 15 years ago reportedly wnl; Last mammogram 5 years ago reportedly wnl; No history of colonoscopy in past (pt refused), negative occult testing years ago.    In the ED the patient was noted to have abdominal distention and paracentesis performed draining 2.5L. CT CAP notable for ascites and peritoneal carcinomatosis, as well as enlarged pelvic masses.    (03 Jun 2021 22:12)     Spoke with patient at bedside. Denied recent fevers, but noted occasional night sweats. Did not soak through clothes and was not severe enough for patient to notice. No chills. No worsening abdominal pain. Denies headaches, dizziness, diarrhea, rashes, sick contacts, recent travels, or pets at home. No occupational exposures.       prior hospital charts reviewed [ x ]  primary team notes reviewed [ x ]  other consultant notes reviewed [ x ]    PAST MEDICAL & SURGICAL HISTORY:  Hyperlipidemia    Endometriosis    H/O cleft lip  repair as a young child    Allergies  sulfa drugs (Pruritus; Rash)    ANTIMICROBIALS (past 90 days)  MEDICATIONS  (STANDING):  cefTRIAXone   IVPB   100 mL/Hr IV Intermittent (06-04-21 @ 12:02)    cefTRIAXone   IVPB 2000 every 24 hours    MEDICATIONSS: MEDICATIONS  (STANDING):  enoxaparin Injectable 40 daily  furosemide   Injectable 20 daily    SOCIAL HISTORY: No smoking hx. Has hx of being prior social drinker, quit 12/2020    FAMILY HISTORY:  FHx: prostate cancer (Father)    FH: breast cancer (Aunt)    REVIEW OF SYSTEMS:    CONSTITUTIONAL: (+) weakness, No fevers or chills  EYES/ENT: No visual changes; No vertigo or throat pain   NECK: No pain or stiffness  RESPIRATORY: + cough no wheezing; No hemoptysis; + shortness of breath/HOPKINS  CARDIOVASCULAR: No chest pain or palpitations; No LE edema; (+)2 pillow orthopnea  GASTROINTESTINAL: No abdominal or epigastric pain. No nausea, (+) dry heaves; No vomiting, or hematemesis; No diarrhea or constipation. No melena or hematochezia. No stool incontinence. GENITOURINARY: No dysuria, frequency or hematuria; (+)weak urinary stream/"dribbling"; has had one episode of urinary incontinence 1x/week when getting up from bed, otherwise no reported incontinence  NEUROLOGICAL: No numbness, paresthesias or weakness; No syncope; No saddle anesthesia  MSK: ambulates with walker, no falls  All other review of systems is negative unless indicated above.    Vital Signs Last 24 Hrs  T(F): 97.8 (06-04-21 @ 12:00), Max: 98.5 (06-03-21 @ 22:30)  Vital Signs Last 24 Hrs  HR: 124 (06-04-21 @ 12:00) (116 - 130)  BP: 122/75 (06-04-21 @ 12:00) (110/75 - 134/79)  RR: 16 (06-04-21 @ 12:00)  SpO2: 97% (06-04-21 @ 12:00) (96% - 98%)  Wt(kg): --    GENERAL: No acute distress, well-developed, sitting up in bed  HEAD:  Atraumatic, Normocephalic  EYES: EOMI, conjunctiva and sclera clear  NECK: Supple,  CHEST/LUNG: Decreased breath sounds at lung bases. Otherwise clear aeration in upper lung regions  HEART: Tachycardic but no murumurs, rubs, or gallops  ABDOMEN: Distended with ascites; nontender to palpation. No rebound tenderness  EXTREMITIES: No clubbing, cyanosis, or edema  NEUROLOGY: A&O x 3, no focal deficits  SKIN: No rashes or lesions, especially overlying abdomen.                        13.0   8.88  )-----------( 471      ( 04 Jun 2021 07:05 )             39.0   06-04    128<L>  |  90<L>  |  28<H>  ----------------------------<  97  3.8   |  19<L>  |  0.90    Ca    9.8      04 Jun 2021 07:05    TPro  5.6<L>  /  Alb  2.8<L>  /  TBili  0.4  /  DBili  x   /  AST  22  /  ALT  11  /  AlkPhos  89  06-04    Urinalysis Basic - ( 04 Jun 2021 07:03 )    Color: Yellow / Appearance: Clear / SG: >1.050 / pH: x  Gluc: x / Ketone: Large  / Bili: Small / Urobili: 3 mg/dL   Blood: x / Protein: 100 mg/dL / Nitrite: Negative   Leuk Esterase: Negative / RBC: MANY /HPF / WBC 1 /HPF   Sq Epi: x / Non Sq Epi: Occasional / Bacteria: Few    MICROBIOLOGY:  Culture - Body Fluid with Gram Stain (collected 03 Jun 2021 23:34)  Source: .Body Fluid Peritoneal Fluid  Gram Stain (04 Jun 2021 04:14):    polymorphonuclear leukocytes seen    Gram positive cocci in pairs seen    by cytocentrifuge    RADIOLOGY:  imaging below personally reviewed and agree with findings    < from: CT Abdomen and Pelvis w/ IV Cont (06.03.21 @ 19:07) >    EXAM:  CT ABDOMEN AND PELVIS IC      EXAM:  CT ANGIO CHEST [facility]      PROCEDURE DATE:  Raul  3 2021     INTERPRETATION:  CLINICAL INFORMATION: Abdominal pain and distention, cough    COMPARISON: None.    CONTRAST/COMPLICATIONS:  IV Contrast: Omnipaque 350  90 cc administered   10 cc discarded  Oral Contrast: NONE  Complications: None reported at time of study completion    PROCEDURE:  CT Angiography of the Chest was performed followed by portal venous phase imaging of the Abdomen and Pelvis.  Sagittal and coronal reformats were performed as well as 3D (MIP) reconstructions.    FINDINGS:  CHEST:    PULMONARY ARTERY: No pulmonary embolus in the main, left and right pulmonary arteries. There is suboptimal opacification of the bilateral lower lobe pulmonary arteries, however there is no evidence of definitive filling defect and the atelectatic lower lobes opacify enhanced homogeneously.  LUNGS AND LARGE AIRWAYS: Patent central airways. Near-complete passive atelectasis of the bilateral lower lobes.  PLEURA: Moderate to large bilateral pleural effusions, extending to the apices. No evidence of pneumothorax.  VESSELS: Atherosclerotic changes.  HEART: Heart size is normal. No pericardial effusion.  MEDIASTINUM AND TENNILLE: No lymphadenopathy.  CHEST WALL AND LOWER NECK: Cardiophrenic lymphadenopathy. Small coarse calcifications in the right thyroid lobe.    ABDOMEN AND PELVIS:  LIVER: Tiny subcentimeter hypodense focus in the left hepatic lobe that is too small to characterize.  BILE DUCTS: Normal caliber.  GALLBLADDER: Cholelithiasis.  SPLEEN: Within normal limits.  PANCREAS: Within normal limits.  ADRENALS: Within normal limits.  KIDNEYS/URETERS: No hydronephrosis. Symmetric enhancement. Tiny right upper pole hypodensefocus that is too small to characterize.    BLADDER: Collapsed around a Cherry catheter with associated small foci of intraluminal air.  REPRODUCTIVE ORGANS: Enlarged and heterogeneous appearance of the bilateral adnexa/ovaries.    BOWEL: Innumerable peritoneal implants along the bowel. No bowel obstruction. Colonic diverticulosis.  PERITONEUM: Peritoneal carcinomatosis. Omental caking. Moderate volume ascites.  VESSELS: Within normal limits.  RETROPERITONEUM/LYMPH NODES: Retroperitoneal lymphadenopathy.  ABDOMINAL WALL: Umbilical and supraumbilical hernias containing peritoneal implants.  BONES: Degenerative changes.    IMPRESSION:  No pulmonary embolus in the main, left and right pulmonary arteries. Suboptimal evaluation of the bilateral lower lobe pulmonary arteries, however no definitive evidence of pulmonary embolism and homogeneous enhancement of the atelectatic lower lobes.    Moderate to large layering bilateral pleural effusions with near-complete atelectasis of the bilateral lower lobes.    Enlarged and heterogeneous appearance of the bilateral adnexa/ovaries, for which ovarian-based neoplasm should be considered.    Peritoneal carcinomatosis. Retroperitoneal and cardiophrenic adenopathy. Moderate volume malignant ascites.    CHERYL OCHOA MD; Resident Radiology  This document has been electronically signed.  MAHNAZ HASSAN M.D., ATTENDING RADIOLOGIST  This document has been electronically signed. Raul  3 2021  7:59PM    < end of copied text >   Patient is a 66y old  Female who presents with a chief complaint of SOB (04 Jun 2021 07:33)    HPI:  65 y/o female with PMH of HLD who presents to [facility] ED for SOB, couh, weakness and fatigue 2/2 ascites. Patient states she started began feeling increasing fatigue roughly 6 months ago. 4 weeks ago, she received her 1st COVID Moderna vaccination, and then she felt increase fatigue for 2-3 days. Shortly thereafter, 3 weeks ago, she noticed an increase in her abdominal size, which has becoming increasing large. Her abdominal size increased 'quickly', and causing decreased appetite, urinary output and bowel movements. Due to the increasing abdominal pressure, she reports experiencing bouts of urinary incontinence. She further developed epigastric pain. For the last week, she states a history of dry heaves and cough productive of clear phlegm. She states history of hernia, which has been worsened since the increasing size of her belly. Additionally, she tells of burning pain in her right leg, and burning on urination. She denies chest pain.    Last pap smear 15 years ago reportedly wnl; Last mammogram 5 years ago reportedly wnl; No history of colonoscopy in past (pt refused), negative occult testing years ago.    In the ED the patient was noted to have abdominal distention and paracentesis performed draining 2.5L. CT CAP notable for ascites and peritoneal carcinomatosis, as well as enlarged pelvic masses.    (03 Jun 2021 22:12)     Spoke with patient at bedside. Denied recent fevers, but noted occasional night sweats. Did not soak through clothes and was not severe enough for patient to notice. No chills. No worsening abdominal pain. Denies headaches, dizziness, diarrhea, rashes, sick contacts, recent travels, or pets at home. No occupational exposures. Born in .       prior hospital charts reviewed [ x ]  primary team notes reviewed [ x ]  other consultant notes reviewed [ x ]    PAST MEDICAL & SURGICAL HISTORY:  Hyperlipidemia    Endometriosis    H/O cleft lip  repair as a young child    Allergies  sulfa drugs (Pruritus; Rash)    ANTIMICROBIALS (past 90 days)  MEDICATIONS  (STANDING):  cefTRIAXone   IVPB   100 mL/Hr IV Intermittent (06-04-21 @ 12:02)    cefTRIAXone   IVPB 2000 every 24 hours    MEDICATIONSS: MEDICATIONS  (STANDING):  enoxaparin Injectable 40 daily  furosemide   Injectable 20 daily    SOCIAL HISTORY: No smoking hx. Has hx of being prior social drinker, quit 12/2020    FAMILY HISTORY:  FHx: prostate cancer (Father)    FH: breast cancer (Aunt)    REVIEW OF SYSTEMS:    CONSTITUTIONAL: (+) weakness, No fevers or chills  EYES/ENT: No visual changes; No vertigo or throat pain   NECK: No pain or stiffness  RESPIRATORY: + cough no wheezing; No hemoptysis; + shortness of breath/HOPKINS  CARDIOVASCULAR: No chest pain or palpitations; No LE edema; (+)2 pillow orthopnea  GASTROINTESTINAL: No abdominal or epigastric pain. No nausea, (+) dry heaves; No vomiting, or hematemesis; No diarrhea or constipation. No melena or hematochezia. No stool incontinence. GENITOURINARY: No dysuria, frequency or hematuria; (+)weak urinary stream/"dribbling"; has had one episode of urinary incontinence 1x/week when getting up from bed, otherwise no reported incontinence  NEUROLOGICAL: No numbness, paresthesias or weakness; No syncope; No saddle anesthesia  MSK: ambulates with walker, no falls  All other review of systems is negative unless indicated above.    Vital Signs Last 24 Hrs  T(F): 97.8 (06-04-21 @ 12:00), Max: 98.5 (06-03-21 @ 22:30)  Vital Signs Last 24 Hrs  HR: 124 (06-04-21 @ 12:00) (116 - 130)  BP: 122/75 (06-04-21 @ 12:00) (110/75 - 134/79)  RR: 16 (06-04-21 @ 12:00)  SpO2: 97% (06-04-21 @ 12:00) (96% - 98%)  Wt(kg): --    GENERAL: No acute distress, well-developed, sitting up in bed  HEAD:  Atraumatic, Normocephalic  EYES: EOMI, conjunctiva and sclera clear  NECK: Supple,  CHEST/LUNG: Decreased breath sounds at lung bases. Otherwise clear aeration in upper lung regions  HEART: Tachycardic but no murumurs, rubs, or gallops  ABDOMEN: Distended with ascites; nontender to palpation. No rebound tenderness  EXTREMITIES: No clubbing, cyanosis, or edema  NEUROLOGY: A&O x 3, no focal deficits  SKIN: No rashes or lesions, especially overlying abdomen.                        13.0   8.88  )-----------( 471      ( 04 Jun 2021 07:05 )             39.0   06-04    128<L>  |  90<L>  |  28<H>  ----------------------------<  97  3.8   |  19<L>  |  0.90    Ca    9.8      04 Jun 2021 07:05    TPro  5.6<L>  /  Alb  2.8<L>  /  TBili  0.4  /  DBili  x   /  AST  22  /  ALT  11  /  AlkPhos  89  06-04    Urinalysis Basic - ( 04 Jun 2021 07:03 )    Color: Yellow / Appearance: Clear / SG: >1.050 / pH: x  Gluc: x / Ketone: Large  / Bili: Small / Urobili: 3 mg/dL   Blood: x / Protein: 100 mg/dL / Nitrite: Negative   Leuk Esterase: Negative / RBC: MANY /HPF / WBC 1 /HPF   Sq Epi: x / Non Sq Epi: Occasional / Bacteria: Few    MICROBIOLOGY:  Culture - Body Fluid with Gram Stain (collected 03 Jun 2021 23:34)  Source: .Body Fluid Peritoneal Fluid  Gram Stain (04 Jun 2021 04:14):    polymorphonuclear leukocytes seen    Gram positive cocci in pairs seen    by cytocentrifuge    RADIOLOGY:  imaging below personally reviewed and agree with findings    < from: CT Abdomen and Pelvis w/ IV Cont (06.03.21 @ 19:07) >    EXAM:  CT ABDOMEN AND PELVIS IC      EXAM:  CT ANGIO CHEST PULM ART WAWIC      PROCEDURE DATE:  Raul  3 2021     INTERPRETATION:  CLINICAL INFORMATION: Abdominal pain and distention, cough    COMPARISON: None.    CONTRAST/COMPLICATIONS:  IV Contrast: Omnipaque 350  90 cc administered   10 cc discarded  Oral Contrast: NONE  Complications: None reported at time of study completion    PROCEDURE:  CT Angiography of the Chest was performed followed by portal venous phase imaging of the Abdomen and Pelvis.  Sagittal and coronal reformats were performed as well as 3D (MIP) reconstructions.    FINDINGS:  CHEST:    PULMONARY ARTERY: No pulmonary embolus in the main, left and right pulmonary arteries. There is suboptimal opacification of the bilateral lower lobe pulmonary arteries, however there is no evidence of definitive filling defect and the atelectatic lower lobes opacify enhanced homogeneously.  LUNGS AND LARGE AIRWAYS: Patent central airways. Near-complete passive atelectasis of the bilateral lower lobes.  PLEURA: Moderate to large bilateral pleural effusions, extending to the apices. No evidence of pneumothorax.  VESSELS: Atherosclerotic changes.  HEART: Heart size is normal. No pericardial effusion.  MEDIASTINUM AND TENNILLE: No lymphadenopathy.  CHEST WALL AND LOWER NECK: Cardiophrenic lymphadenopathy. Small coarse calcifications in the right thyroid lobe.    ABDOMEN AND PELVIS:  LIVER: Tiny subcentimeter hypodense focus in the left hepatic lobe that is too small to characterize.  BILE DUCTS: Normal caliber.  GALLBLADDER: Cholelithiasis.  SPLEEN: Within normal limits.  PANCREAS: Within normal limits.  ADRENALS: Within normal limits.  KIDNEYS/URETERS: No hydronephrosis. Symmetric enhancement. Tiny right upper pole hypodensefocus that is too small to characterize.    BLADDER: Collapsed around a Cherry catheter with associated small foci of intraluminal air.  REPRODUCTIVE ORGANS: Enlarged and heterogeneous appearance of the bilateral adnexa/ovaries.    BOWEL: Innumerable peritoneal implants along the bowel. No bowel obstruction. Colonic diverticulosis.  PERITONEUM: Peritoneal carcinomatosis. Omental caking. Moderate volume ascites.  VESSELS: Within normal limits.  RETROPERITONEUM/LYMPH NODES: Retroperitoneal lymphadenopathy.  ABDOMINAL WALL: Umbilical and supraumbilical hernias containing peritoneal implants.  BONES: Degenerative changes.    IMPRESSION:  No pulmonary embolus in the main, left and right pulmonary arteries. Suboptimal evaluation of the bilateral lower lobe pulmonary arteries, however no definitive evidence of pulmonary embolism and homogeneous enhancement of the atelectatic lower lobes.    Moderate to large layering bilateral pleural effusions with near-complete atelectasis of the bilateral lower lobes.    Enlarged and heterogeneous appearance of the bilateral adnexa/ovaries, for which ovarian-based neoplasm should be considered.    Peritoneal carcinomatosis. Retroperitoneal and cardiophrenic adenopathy. Moderate volume malignant ascites.    CHERYL OCHOA MD; Resident Radiology  This document has been electronically signed.  MAHNAZ HASSAN M.D., ATTENDING RADIOLOGIST  This document has been electronically signed. Raul  3 2021  7:59PM    < end of copied text >

## 2021-06-04 NOTE — CONSULT NOTE ADULT - ASSESSMENT
Patient is a 59yo F with PMHx of HLD who presents to [facility] ED for SOB, cough, weakness and fatigue 2/2 malignant ascites with concern of GYN malignancy. ID consulted for ascites fluid growing GPC likely strep.    - VS afebrile, tachycardia 120s, on nasal cannula   - no leukocytosis/penia  - s/p paracentesis with SAAG 1.4 c/w portal HTN,   - elevated LDH in ascitic fluid of 473  - T protein in fluid 3.7    Recommendations:   Incomplete, not discussed with ID.       Patient is a 61yo F with PMHx of HLD who presents to [facility] ED for SOB, cough, weakness and fatigue 2/2 malignant ascites with concern of GYN malignancy. ID consulted for ascites fluid growing GPC in pairs, likely strep.    - VS afebrile, tachycardia 120s, on nasal cannula   - no leukocytosis/penia  - s/p paracentesis with SAAG 1.4 c/w portal HTN,   - elevated LDH in ascitic fluid of 473  - T protein in fluid 3.7    Recommendations:   Incomplete, not discussed with ID.       Patient is a 59yo F with PMHx of HLD who presents to [facility] ED for SOB, cough, weakness and fatigue 2/2 malignant ascites with concern of GYN malignancy. ID consulted for ascites fluid growing GPC in pairs.    - VS afebrile, tachycardia 120s, on nasal cannula   - no leukocytosis/penia  - s/p paracentesis, elevated LDH in ascitic fluid of 473, T protein in fluid 3.7    Recommendations:   - Consider zosyn for now until cultures populate. GPC in pairs/ chains can also include enterococcus, and would be covered.

## 2021-06-05 NOTE — PHYSICAL THERAPY INITIAL EVALUATION ADULT - PERTINENT HX OF CURRENT PROBLEM, REHAB EVAL
Pt is a 66 year old female presenting with SOB, cough, weakness, abdominal bloating and fatigue. CT CAP notable for ascites and peritoneal carcinomatosis, as well as enlarged pelvic masses. 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 Also with bilateral pleural effusions. PMH: HLD, Endometriosis.

## 2021-06-05 NOTE — PHYSICAL THERAPY INITIAL EVALUATION ADULT - GENERAL OBSERVATIONS, REHAB EVAL
Pt received semisupine in bed, +oxygen via nasal cannula, +sandoval, in NAD. Pt agreeable to participate in PT evaluation.

## 2021-06-05 NOTE — PROGRESS NOTE ADULT - PROBLEM SELECTOR PLAN 4
Patient complaining of some mild dysuria associated with trouble urinating. sandoval placed in the ED w/5cc dark yellow urine output per RN notes  - Urine culture  -on ceftriaxone Patient complaining of some mild dysuria associated with trouble urinating. sandoval placed in the ED w/5cc dark yellow urine output per RN notes  - Urine culture  -on Zosyn

## 2021-06-05 NOTE — PROGRESS NOTE ADULT - PROBLEM SELECTOR PLAN 6
Seen on imaging. Enlarged and heterogeneous appearance of the bilateral adnexa/ovaries  - gyn onc and onc consulted

## 2021-06-05 NOTE — PROGRESS NOTE ADULT - PROBLEM SELECTOR PLAN 1
Moderate volume ascites in CT. SAAG was 1.4 which in setting of imaging points to malignant ascites with portal hypertension. Likely 2/2 ovarian primary. Tap w/GPC in pairs, ?contaminant, will treat with CTX for now (pending UA), and will call ID consult  - follow up cell count and fluid studies from paracentesis  -onc and gyn-onc are following  - check CEA, CA  -urine culture Moderate volume ascites in CT. SAAG was 1.4 which in setting of imaging points to malignant ascites with portal hypertension. Likely 2/2 ovarian primary. Tap w/GPC in pairs, ?contaminant, will treat with Zosyn per ID   - Cell count cannot be added on   -onc and gyn-onc are following  - check CEA, CA  -urine culture  [ ] Plan for repeat para and bx

## 2021-06-05 NOTE — PROGRESS NOTE ADULT - SUBJECTIVE AND OBJECTIVE BOX
Patient is a 66y old  Female who presents with a chief complaint of SOB (04 Jun 2021 15:36)      SUBJECTIVE / OVERNIGHT EVENTS:    ADDITIONAL REVIEW OF SYSTEMS:    MEDICATIONS  (STANDING):  enoxaparin Injectable 40 milliGRAM(s) SubCutaneous daily  furosemide   Injectable 20 milliGRAM(s) IV Push daily  piperacillin/tazobactam IVPB.. 3.375 Gram(s) IV Intermittent every 8 hours    MEDICATIONS  (PRN):      CAPILLARY BLOOD GLUCOSE        I&O's Summary    04 Jun 2021 07:01  -  05 Jun 2021 07:00  --------------------------------------------------------  IN: 310 mL / OUT: 950 mL / NET: -640 mL        PHYSICAL EXAM:  Vital Signs Last 24 Hrs  T(C): 36.4 (05 Jun 2021 06:08), Max: 36.8 (04 Jun 2021 14:19)  T(F): 97.6 (05 Jun 2021 06:08), Max: 98.2 (04 Jun 2021 14:19)  HR: 116 (05 Jun 2021 06:08) (116 - 129)  BP: 121/72 (05 Jun 2021 06:08) (105/77 - 128/75)  BP(mean): --  RR: 20 (05 Jun 2021 06:08) (16 - 20)  SpO2: 97% (05 Jun 2021 06:08) (97% - 98%)    GENERAL: No acute distress, well-developed  HEAD:  Atraumatic, Normocephalic  EYES: EOMI, PERRLA, conjunctiva and sclera clear  NECK: Supple, no lymphadenopathy, no JVD  CHEST/LUNG: CTAB; No wheezes, rales, or rhonchi  HEART: Regular rate and rhythm; No murmurs, rubs, or gallops  ABDOMEN: Soft, non-tender, non-distended; normal bowel sounds, no organomegaly  EXTREMITIES:  2+ peripheral pulses b/l, No clubbing, cyanosis, or edema  NEUROLOGY: A&O x 3, no focal deficits  SKIN: No rashes or lesions    LABS:                        13.0   8.88  )-----------( 471      ( 04 Jun 2021 07:05 )             39.0     06-04    128<L>  |  90<L>  |  28<H>  ----------------------------<  97  3.8   |  19<L>  |  0.90    Ca    9.8      04 Jun 2021 07:05    TPro  5.6<L>  /  Alb  2.8<L>  /  TBili  0.4  /  DBili  x   /  AST  22  /  ALT  11  /  AlkPhos  89  06-04    PT/INR - ( 03 Jun 2021 13:38 )   PT: 13.5 sec;   INR: 1.18 ratio         PTT - ( 03 Jun 2021 13:38 )  PTT:24.7 sec      Urinalysis Basic - ( 04 Jun 2021 07:03 )    Color: Yellow / Appearance: Clear / SG: >1.050 / pH: x  Gluc: x / Ketone: Large  / Bili: Small / Urobili: 3 mg/dL   Blood: x / Protein: 100 mg/dL / Nitrite: Negative   Leuk Esterase: Negative / RBC: MANY /HPF / WBC 1 /HPF   Sq Epi: x / Non Sq Epi: Occasional / Bacteria: Few        Culture - Body Fluid with Gram Stain (collected 03 Jun 2021 23:34)  Source: .Body Fluid Peritoneal Fluid  Gram Stain (04 Jun 2021 04:14):    polymorphonuclear leukocytes seen    Gram positive cocci in pairs seen    by cytocentrifuge  Preliminary Report (04 Jun 2021 22:17):    No growth        RADIOLOGY & ADDITIONAL TESTS:  Results Reviewed:   Imaging Personally Reviewed:  Electrocardiogram Personally Reviewed:    COORDINATION OF CARE:  Care Discussed with Consultants/Other Providers [Y/N]:  Prior or Outpatient Records Reviewed [Y/N]:     Patient is a 66y old  Female who presents with a chief complaint of SOB (04 Jun 2021 15:36)      SUBJECTIVE / OVERNIGHT EVENTS: Abdominal distension unchanged, not able to tolerate much PO. Has support with family. Understands possible cancer dx.     ADDITIONAL REVIEW OF SYSTEMS:    MEDICATIONS  (STANDING):  enoxaparin Injectable 40 milliGRAM(s) SubCutaneous daily  furosemide   Injectable 20 milliGRAM(s) IV Push daily  piperacillin/tazobactam IVPB.. 3.375 Gram(s) IV Intermittent every 8 hours    MEDICATIONS  (PRN):      CAPILLARY BLOOD GLUCOSE        I&O's Summary    04 Jun 2021 07:01  -  05 Jun 2021 07:00  --------------------------------------------------------  IN: 310 mL / OUT: 950 mL / NET: -640 mL        PHYSICAL EXAM:  Vital Signs Last 24 Hrs  T(C): 36.4 (05 Jun 2021 06:08), Max: 36.8 (04 Jun 2021 14:19)  T(F): 97.6 (05 Jun 2021 06:08), Max: 98.2 (04 Jun 2021 14:19)  HR: 116 (05 Jun 2021 06:08) (116 - 129)  BP: 121/72 (05 Jun 2021 06:08) (105/77 - 128/75)  BP(mean): --  RR: 20 (05 Jun 2021 06:08) (16 - 20)  SpO2: 97% (05 Jun 2021 06:08) (97% - 98%)    GENERAL: No acute distress, well-developed  HEAD:  Atraumatic, Normocephalic  EYES: EOMI, PERRLA, conjunctiva and sclera clear  NECK: Supple, no lymphadenopathy, no JVD  CHEST/LUNG: CTAB; No wheezes, rales, or rhonchi  HEART: Regular rate and rhythm; No murmurs, rubs, or gallops  ABDOMEN: Soft, non-tender, non-distended; normal bowel sounds, no organomegaly  EXTREMITIES:  2+ peripheral pulses b/l, No clubbing, cyanosis, or edema  NEUROLOGY: A&O x 3, no focal deficits  SKIN: No rashes or lesions    LABS:                        13.0   8.88  )-----------( 471      ( 04 Jun 2021 07:05 )             39.0     06-04    128<L>  |  90<L>  |  28<H>  ----------------------------<  97  3.8   |  19<L>  |  0.90    Ca    9.8      04 Jun 2021 07:05    TPro  5.6<L>  /  Alb  2.8<L>  /  TBili  0.4  /  DBili  x   /  AST  22  /  ALT  11  /  AlkPhos  89  06-04    PT/INR - ( 03 Jun 2021 13:38 )   PT: 13.5 sec;   INR: 1.18 ratio         PTT - ( 03 Jun 2021 13:38 )  PTT:24.7 sec      Urinalysis Basic - ( 04 Jun 2021 07:03 )    Color: Yellow / Appearance: Clear / SG: >1.050 / pH: x  Gluc: x / Ketone: Large  / Bili: Small / Urobili: 3 mg/dL   Blood: x / Protein: 100 mg/dL / Nitrite: Negative   Leuk Esterase: Negative / RBC: MANY /HPF / WBC 1 /HPF   Sq Epi: x / Non Sq Epi: Occasional / Bacteria: Few        Culture - Body Fluid with Gram Stain (collected 03 Jun 2021 23:34)  Source: .Body Fluid Peritoneal Fluid  Gram Stain (04 Jun 2021 04:14):    polymorphonuclear leukocytes seen    Gram positive cocci in pairs seen    by cytocentrifuge  Preliminary Report (04 Jun 2021 22:17):    No growth        RADIOLOGY & ADDITIONAL TESTS:  Results Reviewed:   Imaging Personally Reviewed:  Electrocardiogram Personally Reviewed:    COORDINATION OF CARE:  Care Discussed with Consultants/Other Providers [Y/N]:  Prior or Outpatient Records Reviewed [Y/N]:     Patient is a 66y old  Female who presents with a chief complaint of SOB (04 Jun 2021 15:36)      SUBJECTIVE / OVERNIGHT EVENTS: Abdominal distension unchanged, not able to tolerate much PO. Has support with family. Understands possible cancer dx.     ADDITIONAL REVIEW OF SYSTEMS:    MEDICATIONS  (STANDING):  enoxaparin Injectable 40 milliGRAM(s) SubCutaneous daily  furosemide   Injectable 20 milliGRAM(s) IV Push daily  piperacillin/tazobactam IVPB.. 3.375 Gram(s) IV Intermittent every 8 hours    MEDICATIONS  (PRN):      CAPILLARY BLOOD GLUCOSE        I&O's Summary    04 Jun 2021 07:01  -  05 Jun 2021 07:00  --------------------------------------------------------  IN: 310 mL / OUT: 950 mL / NET: -640 mL        PHYSICAL EXAM:  Vital Signs Last 24 Hrs  T(C): 36.4 (05 Jun 2021 06:08), Max: 36.8 (04 Jun 2021 14:19)  T(F): 97.6 (05 Jun 2021 06:08), Max: 98.2 (04 Jun 2021 14:19)  HR: 116 (05 Jun 2021 06:08) (116 - 129)  BP: 121/72 (05 Jun 2021 06:08) (105/77 - 128/75)  BP(mean): --  RR: 20 (05 Jun 2021 06:08) (16 - 20)  SpO2: 97% (05 Jun 2021 06:08) (97% - 98%)    GENERAL: No acute distress, well-developed  HEAD:  Atraumatic, Normocephalic  EYES: EOMI, PERRLA, conjunctiva and sclera clear  NECK: Supple, no lymphadenopathy, no JVD  CHEST/LUNG: CTAB; No wheezes, rales, or rhonchi  HEART: Regular rate and rhythm; No murmurs, rubs, or gallops  ABDOMEN: Soft, non-tender, Distended  EXTREMITIES:  2+ peripheral pulses b/l, No clubbing, cyanosis, or edema  NEUROLOGY: A&O x 3, no focal deficits  SKIN: No rashes or lesions    LABS:                        13.0   8.88  )-----------( 471      ( 04 Jun 2021 07:05 )             39.0     06-04    128<L>  |  90<L>  |  28<H>  ----------------------------<  97  3.8   |  19<L>  |  0.90    Ca    9.8      04 Jun 2021 07:05    TPro  5.6<L>  /  Alb  2.8<L>  /  TBili  0.4  /  DBili  x   /  AST  22  /  ALT  11  /  AlkPhos  89  06-04    PT/INR - ( 03 Jun 2021 13:38 )   PT: 13.5 sec;   INR: 1.18 ratio         PTT - ( 03 Jun 2021 13:38 )  PTT:24.7 sec      Urinalysis Basic - ( 04 Jun 2021 07:03 )    Color: Yellow / Appearance: Clear / SG: >1.050 / pH: x  Gluc: x / Ketone: Large  / Bili: Small / Urobili: 3 mg/dL   Blood: x / Protein: 100 mg/dL / Nitrite: Negative   Leuk Esterase: Negative / RBC: MANY /HPF / WBC 1 /HPF   Sq Epi: x / Non Sq Epi: Occasional / Bacteria: Few        Culture - Body Fluid with Gram Stain (collected 03 Jun 2021 23:34)  Source: .Body Fluid Peritoneal Fluid  Gram Stain (04 Jun 2021 04:14):    polymorphonuclear leukocytes seen    Gram positive cocci in pairs seen    by cytocentrifuge  Preliminary Report (04 Jun 2021 22:17):    No growth        RADIOLOGY & ADDITIONAL TESTS:  Results Reviewed:   Imaging Personally Reviewed:  Electrocardiogram Personally Reviewed:    COORDINATION OF CARE:  Care Discussed with Consultants/Other Providers [Y/N]:  Prior or Outpatient Records Reviewed [Y/N]:

## 2021-06-05 NOTE — PROGRESS NOTE ADULT - ATTENDING COMMENTS
66 y.o. F w/ a hx of endometriosis, HLD who presents with abdominal distension, pain, dysuria, found to have new ascites and peritoneal carcinomatosis.     # Peritoneal carcinomatosis w/ ascites: S/p paracentesis w/ 2.5L fluid removal, not sent for cell count. Gram stain + GPC in pairs, cont Zosyn. ID following. Cytology not sent, cannot be added on. Gyn-onc, Oncology following and recommend biopsy. Abdomen appears distended though not firm, will likely need another paracentesis this admission.   # Dysuria: U/A bland appearing. Will try to collect UCx though pt already on abx for SBP.   # Dyspnea: Likely 2/ pleural effusions and abdominal distension. Wean O2

## 2021-06-05 NOTE — PROGRESS NOTE ADULT - PROBLEM SELECTOR PLAN 3
3 weeks of fatigue weakness sob associated with abdominal distention. CT chest without evidence of PE though patient tachycardic. Dyspnea likely 2/2 abdominal distention/ascites and pleural effusions likely from translocation of ascites. Improved after 2.5L paracentesis drain in the ED.   - continue with oxygen as needed  - Incentive spirometer, encourage ambulation   - lasix   - pro-bnp wnl for age  reporting 2 pillow orthopnea, likely related to pleural effusions, pro-bnp wnl for age and no LE edema to suggest cardiac dysfunction; if shortness of breath persists, can check echo

## 2021-06-05 NOTE — PROGRESS NOTE ADULT - PROBLEM SELECTOR PLAN 2
Likely causing worsening SOB and from translocation from malignant ascites.  continue to monitor  -lasix 20 mg IV Likely causing worsening SOB and from translocation from malignant ascites.  continue to monitor  -lasix 20 mg IV PRN

## 2021-06-05 NOTE — PHYSICAL THERAPY INITIAL EVALUATION ADULT - ADDITIONAL COMMENTS
Pt lives in a condo with elevator access. Pt's baseline: independent. For past few weeks, pt has been slowly deteriorating and has had to use a rolling walker.    Pt was left semi-supine with head of bed elevated to 30°, all lines/tubes intact and call bell within reach, RN aware.

## 2021-06-05 NOTE — PROGRESS NOTE ADULT - SUBJECTIVE AND OBJECTIVE BOX
CC: Patient is a 66y old  Female who presents with a chief complaint of SOB (05 Jun 2021 07:31)    ID following for peritonitis    Interval History/ROS: Patient states no abd pain, but with bilateral flank discomfort. No fevers, no chills. Abd remains distended. WBC WNL.     Rest of ROS negative.    Allergies  sulfa drugs (Pruritus; Rash)    ANTIMICROBIALS:  piperacillin/tazobactam IVPB.. 3.375 every 8 hours    OTHER MEDS:  enoxaparin Injectable 40 milliGRAM(s) SubCutaneous daily    PE:    Vital Signs Last 24 Hrs  T(C): 36.4 (05 Jun 2021 06:08), Max: 36.8 (04 Jun 2021 14:19)  T(F): 97.6 (05 Jun 2021 06:08), Max: 98.2 (04 Jun 2021 14:19)  HR: 116 (05 Jun 2021 06:08) (116 - 129)  BP: 121/72 (05 Jun 2021 06:08) (105/77 - 127/64)  BP(mean): --  RR: 20 (05 Jun 2021 06:08) (16 - 20)  SpO2: 97% (05 Jun 2021 06:08) (97% - 98%)    Gen: AOx3, NAD  CV: S1+S2 normal, no murmurs  Resp: Clear bilat, no resp distress  Abd: Soft, nontender, +BS, distended  Ext: No LE edema, no wounds  : No Cherry  IV/Skin: No thrombophlebitis  Neuro: no focal deficits    LABS:                          13.2   9.99  )-----------( 482      ( 05 Jun 2021 08:29 )             39.6       06-05    128<L>  |  89<L>  |  29<H>  ----------------------------<  117<H>  3.6   |  22  |  1.00    Ca    9.6      05 Jun 2021 08:29  Phos  3.7     06-05  Mg     2.1     06-05    TPro  5.6<L>  /  Alb  2.8<L>  /  TBili  0.4  /  DBili  x   /  AST  22  /  ALT  11  /  AlkPhos  89  06-04      Urinalysis Basic - ( 04 Jun 2021 07:03 )    Color: Yellow / Appearance: Clear / SG: >1.050 / pH: x  Gluc: x / Ketone: Large  / Bili: Small / Urobili: 3 mg/dL   Blood: x / Protein: 100 mg/dL / Nitrite: Negative   Leuk Esterase: Negative / RBC: MANY /HPF / WBC 1 /HPF   Sq Epi: x / Non Sq Epi: Occasional / Bacteria: Few        MICROBIOLOGY:  v  .Body Fluid Peritoneal Fluid  06-03-21   No growth  --    polymorphonuclear leukocytes seen  Gram positive cocci in pairs seen  by cytocentrifuge    RADIOLOGY:    < from: CT Abdomen and Pelvis w/ IV Cont (06.03.21 @ 19:07) >  IMPRESSION:  No pulmonary embolus in the main, left and right pulmonary arteries. Suboptimal evaluation of the bilateral lower lobe pulmonary arteries, however no definitive evidence of pulmonary embolism and homogeneous enhancement of the atelectatic lower lobes.    < end of copied text >

## 2021-06-05 NOTE — PHYSICAL THERAPY INITIAL EVALUATION ADULT - PATIENT PROFILE REVIEW, REHAB EVAL
PT orders received: no formal activity orders. Consult with ANICETO Hager, patient may participate in PT evaluation./yes

## 2021-06-06 NOTE — PROGRESS NOTE ADULT - PROBLEM SELECTOR PLAN 4
Patient complaining of some mild dysuria associated with trouble urinating. sandoval placed in the ED w/5cc dark yellow urine output per RN notes  - Urine culture  -on Zosyn Patient complaining of some mild dysuria associated with trouble urinating. sandoval placed in the ED w/5cc dark yellow urine output per RN notes  - Urine culture w/ NGTD  - will discontinue Zosyn

## 2021-06-06 NOTE — DIETITIAN INITIAL EVALUATION ADULT. - ORAL INTAKE PTA/DIET HISTORY
Met patient at bedside. At baseline patient consumes 3 meals/day, does not follow any dietary restrictions. Patient reports decreased appetite/po intake x ~6 weeks (after receiving the first dose of the Moderna vaccine on 4/28/21). Patient states that she was barely able to consume any foods; attributes this to her growing abdominal distention. Based on dietary recall, patient likely meeting <50% of estimated nutrient needs during this time PTA.

## 2021-06-06 NOTE — PROVIDER CONTACT NOTE (CRITICAL VALUE NOTIFICATION) - TEST AND RESULT REPORTED:
PML gram (+) cocci previously reported is amended to PML no organisms
body fluid culture from 6/3/21 positive gram stain polymorphonuclear leukocytes seen. gram + cocci in pairs seen

## 2021-06-06 NOTE — DIETITIAN INITIAL EVALUATION ADULT. - PERTINENT LABORATORY DATA
06-06 Na128 mmol/L<L> Glu 108 mg/dL<H> K+ 3.8 mmol/L Cr  1.01 mg/dL BUN 34 mg/dL<H> 06-06 Phos 4.3 mg/dL 06-04 Alb 2.8 g/dL<L>

## 2021-06-06 NOTE — PROGRESS NOTE ADULT - PROBLEM SELECTOR PLAN 1
Moderate volume ascites in CT. SAAG was 1.4 which in setting of imaging points to malignant ascites with portal hypertension. Likely 2/2 ovarian primary. Tap w/GPC in pairs, ?contaminant, will treat with Zosyn per ID   - Cell count cannot be added on   -onc and gyn-onc are following  - check CEA, CA  -urine culture  [ ] Plan for repeat para and bx Moderate volume ascites in CT. SAAG was 1.4 which in setting of imaging points to malignant ascites with portal hypertension. Likely 2/2 ovarian primary. Tap w/GPC in pairs, ?contaminant, will treat with Zosyn per ID   - Cell count cannot be added on   -onc and gyn-onc are following  - check CEA, CA  -urine culture  [ ] Plan for lymph node bx w/ IR on 6/7  - will repeat abdomen US, if still with ascites will plan to do paracent Moderate volume ascites in CT. SAAG was 1.4 which in setting of imaging points to malignant ascites with portal hypertension. Likely 2/2 ovarian primary.   - Tap w/ leukocytes but no organisms, will d/c Zosyn   - Cell count cannot be added on   -onc and gyn-onc are following  - CEA- elevated to 7, CA-125 elevated to 1189  [ ] Plan for lymph node bx w/ IR on 6/7  - will repeat abdomen US today, if still with ascites will plan to do paracenteses with IR biopsy

## 2021-06-06 NOTE — PROGRESS NOTE ADULT - PROBLEM SELECTOR PLAN 7
DVT: Lovenox  Dispo: pending clinical management  Diet: Regular DVT: Lovenox (on hold for IR biopsy tomorrow, resume after)   Dispo: pending clinical management  Diet: Regular

## 2021-06-06 NOTE — PROGRESS NOTE ADULT - PROBLEM SELECTOR PLAN 6
Seen on imaging. Enlarged and heterogeneous appearance of the bilateral adnexa/ovaries  - gyn onc and onc consulted Seen on imaging. Enlarged and heterogeneous appearance of the bilateral adnexa/ovaries  - gyn onc and onc consulted  - CEA and CA-125 elevated   - Pending IR biopsy

## 2021-06-06 NOTE — PROGRESS NOTE ADULT - PROBLEM SELECTOR PLAN 2
Likely causing worsening SOB and from translocation from malignant ascites.  continue to monitor  -lasix 20 mg IV PRN

## 2021-06-06 NOTE — DIETITIAN INITIAL EVALUATION ADULT. - PROBLEM SELECTOR PLAN 6
Seen on imaging. Enlarged and heterogeneous appearance of the bilateral adnexa/ovaries  - gyn onc and onc consult in am

## 2021-06-06 NOTE — DIETITIAN INITIAL EVALUATION ADULT. - PROBLEM SELECTOR PLAN 1
Moderate volume ascites in CT. SAAG was 1.4 which in setting of imaging points to malignant ascites with portal hypertension. Likely 2/2 ovarian primary  - follow up cell count and fluid studies from paracentesis  - onc consult in am   - gyn - onc consult in am   - check CEA, CA    #Addendum  -Tap w/GPC in pairs, ?contaminant, will treat with CTX for now (pending UA), and would call ID consult in AM

## 2021-06-06 NOTE — DIETITIAN INITIAL EVALUATION ADULT. - PROBLEM SELECTOR PLAN 3
3 weeks of fatigue weakness sob associated with abdominal distention. CT chest without evidence of PE though patient tachycardic. Dyspnea likely 2/2 abdominal distention/ascites and pleural effusions likely from translocation of ascites. Improved after 2.5L paracentesis drain in the ED.   - continue with oxygen as needed  - Incentive spirometer, encourage ambulation   - can trial lasix in am if still sob  - pro-bnp wnl for age  reporting 2 pillow orthopnea, likely related to pleural effusions, pro-bnp wnl for age and no LE edema to suggest cardiac dysfunction; if shortness of breath persists, can check echo

## 2021-06-06 NOTE — CONSULT NOTE ADULT - SUBJECTIVE AND OBJECTIVE BOX
History of Present Illness: 66yFemale with history of endometriosis presented with abdominal distention and was found to have ascites and peritoneal carcinomatosis with bilateral adnexal masses, presumed ovarian cancer. She underwent paracentesis in the ED however cytology was not sent. She is now referred for repeat paracentesis and biopsy for tissue diagnosis.    Allergies:   sulfa drugs (Pruritus; Rash)    Medications (Antibiotics/Cardiovascular/Anticoagulants/Blood Products):     enoxaparin Injectable: 40 milliGRAM(s) SubCutaneous (06-05-21 @ 12:14)  furosemide   Injectable: 20 milliGRAM(s) IV Push (06-05-21 @ 06:00)  piperacillin/tazobactam IVPB.: 200 mL/Hr IV Intermittent (06-04-21 @ 18:59)  piperacillin/tazobactam IVPB..: 25 mL/Hr IV Intermittent (06-06-21 @ 06:32)    Vital Signs:  T(F): 99.1 (06-06-21 @ 06:29), Max: 99.1 (06-06-21 @ 06:29)  HR: 111 (06-06-21 @ 06:29) (111 - 126)  BP: 121/67 (06-06-21 @ 06:29) (117/72 - 121/67)  RR: 20 (06-06-21 @ 06:29) (18 - 20)  SpO2: 97% (06-06-21 @ 06:29) (97% - 98%)    Relevant Lab Results:            12.9  12.41)-----(464     (06-06-21 @ 07:33)         38.6     128 | 89 | 34  --------------------< 108     (06-06-21 @ 07:33)  3.8 | 23 | 1.01       PT: 13.5 06-03-21 @ 13:38  aPTT: 24.7<L> 06-03-21 @ 13:38   INR: 1.18<H> 06-03-21 @ 13:38    Imaging: CT reviewed, there is ascites (however interval paracentesis performed in ED), peritoneal carcinomatosis, sister Janet Ferrara nodule in the umbilicus, and bilateral adnexal mass   History of Present Illness: 66yFemale with history of endometriosis presented with abdominal distention and was found to have ascites and peritoneal carcinomatosis with bilateral adnexal masses, presumed ovarian cancer. She underwent paracentesis in the ED however cytology was not sent. She is now referred for repeat paracentesis and biopsy for tissue diagnosis.    Allergies:   sulfa drugs (Pruritus; Rash)    Medications (Antibiotics/Cardiovascular/Anticoagulants/Blood Products):     enoxaparin Injectable: 40 milliGRAM(s) SubCutaneous (06-05-21 @ 12:14)  furosemide   Injectable: 20 milliGRAM(s) IV Push (06-05-21 @ 06:00)  piperacillin/tazobactam IVPB.: 200 mL/Hr IV Intermittent (06-04-21 @ 18:59)  piperacillin/tazobactam IVPB..: 25 mL/Hr IV Intermittent (06-06-21 @ 06:32)    PAST MEDICAL & SURGICAL HISTORY:  Hyperlipidemia    Endometriosis    H/O cleft lip  repair as a young child    Allergies  sulfa drugs (Pruritus; Rash)    MEDICATIONSS: MEDICATIONS  (STANDING):  enoxaparin Injectable 40 daily  furosemide   Injectable 20 daily    SOCIAL HISTORY: No smoking hx. Has hx of being prior social drinker, quit 12/2020    FAMILY HISTORY:  FHx: prostate cancer (Father)    FH: breast cancer (Aunt)    REVIEW OF SYSTEMS:    CONSTITUTIONAL: (+) weakness, No fevers or chills  EYES/ENT: No visual changes; No vertigo or throat pain   NECK: No pain or stiffness  RESPIRATORY: no cough no wheezing; No hemoptysis; + shortness of breath/[place]  CARDIOVASCULAR: No chest pain or palpitations; No LE edema; (+)2 pillow orthopnea  GASTROINTESTINAL: No abdominal or epigastric pain. No nausea, ; No vomiting, or hematemesis; No diarrhea or constipation. No melena or hematochezia. No stool incontinence. GENITOURINARY: No dysuria, frequency or hematuria; (+)weak urinary stream/"dribbling"; has had one episode of urinary incontinence 1x/week when getting up from bed, otherwise no reported incontinence  NEUROLOGICAL: No numbness, paresthesias or weakness; No syncope; No saddle anesthesia  MSK: ambulates with walker, no falls  All other review of systems is negative unless indicated above.      ICU Vital Signs Last 24 Hrs  T(C): 36.6 (07 Jun 2021 13:28), Max: 36.7 (06 Jun 2021 21:33)  T(F): 97.9 (07 Jun 2021 13:28), Max: 98.1 (06 Jun 2021 21:33)  HR: 118 (07 Jun 2021 13:28) (118 - 126)  BP: 112/70 (07 Jun 2021 13:28) (110/65 - 114/68)  BP(mean): --  ABP: --  ABP(mean): --  RR: 18 (07 Jun 2021 13:28) (18 - 18)  SpO2: 95% (07 Jun 2021 06:08) (95% - 95%)      Physical Exam:    Gen: AOx3, NAD  CV: S1+S2 normal, no murmurs  Resp: Clear bilat, no resp distress  Abd: Soft, nontender, +BS, distended  Ext: No LE edema, no wounds  : No Cherry  IV/Skin: No thrombophlebitis  Neuro: no focal deficits    LABS:    Relevant Lab Results:                            12.9   9.67  )-----------( 430      ( 07 Jun 2021 08:20 )             38.1   06-07    128<L>  |  87<L>  |  40<H>  ----------------------------<  74  4.0   |  23  |  0.99    Ca    10.2      07 Jun 2021 08:20  Phos  3.7     06-07  Mg     2.3     06-07    TPro  5.8<L>  /  Alb  2.4<L>  /  TBili  0.6  /  DBili  x   /  AST  29  /  ALT  11  /  AlkPhos  110  06-07            PT/INR - ( 07 Jun 2021 08:20 )   PT: 16.2 sec;   INR: 1.43 ratio         PTT - ( 07 Jun 2021 08:20 )  PTT:35.1 secImaging: CT reviewed, there is ascites (however interval paracentesis performed in ED), peritoneal carcinomatosis, sister Janet Ferrara nodule in the umbilicus, and bilateral adnexal mass

## 2021-06-06 NOTE — CONSULT NOTE ADULT - ASSESSMENT
Assessment: 66y Female with presumed ovarian cancer with peritoneal carcinomatosis for biopsy and paracentesis    Plan:  -will plan to biopsy umbilical lymph node due to ease of access and size  -Please place order for IR Procedure, approving attending Dr. Severino  -obtain ascites check ultrasound today to show if there is significant ascites--if so paracentesis can be performed as time of biopsy  -hold therapeutic and prophylactic anticoagulants  -maintain active type and screen x 2    Laurent Chao MD, RPVI  Chief Resident, Interventional Radiology  [facility]: [phone]  [facility]: [phone]   Assessment: 66y Female with presumed ovarian cancer with peritoneal carcinomatosis for biopsy and paracentesis    Plan:  -will plan to biopsy umbilical lymph node due to ease of access and size  -Please place order for IR Procedure, approving attending Dr. Severino  -obtain ascites check ultrasound today to show if there is significant ascites--if so paracentesis can be performed at time of biopsy  -hold therapeutic and prophylactic anticoagulants  -maintain active type and screen x 2    Laurent Chao MD, RPVI  Chief Resident, Interventional Radiology  [facility]: [phone]  [facility]: [phone]   Assessment: 66y Female with presumed ovarian cancer with peritoneal carcinomatosis for biopsy and paracentesis    Plan:  -will plan to biopsy umbilical lymph node due to ease of access and size  -Please place order for IR Procedure, approving attending Dr. Severino  -obtain ascites check ultrasound to show if there is significant ascites--if so paracentesis can be performed at time of biopsy  -hold therapeutic and prophylactic anticoagulants  -maintain active type and screen x 2  -4AM  cbc, bmp and coags

## 2021-06-06 NOTE — DIETITIAN INITIAL EVALUATION ADULT. - PROBLEM SELECTOR PLAN 4
Patient complaining of some mild dysuria associated with trouble urinating. sandoval placed in the ED w/5cc dark yellow urine output per RN notes  - will get urinalysis; cultures, abx pending UA

## 2021-06-06 NOTE — PROGRESS NOTE ADULT - PROBLEM SELECTOR PLAN 3
3 weeks of fatigue weakness sob associated with abdominal distention. CT chest without evidence of PE though patient tachycardic. Dyspnea likely 2/2 abdominal distention/ascites and pleural effusions likely from translocation of ascites. Improved after 2.5L paracentesis drain in the ED.   - continue with oxygen as needed  - Incentive spirometer, encourage ambulation   - lasix   - pro-bnp wnl for age  reporting 2 pillow orthopnea, likely related to pleural effusions, pro-bnp wnl for age and no LE edema to suggest cardiac dysfunction; if shortness of breath persists, can check echo 3 weeks of fatigue weakness sob associated with abdominal distention. CT chest without evidence of PE though patient tachycardic. Dyspnea likely 2/2 abdominal distention/ascites and pleural effusions likely from translocation of ascites. Improved after 2.5L paracentesis drain in the ED.   - will discontinue off oxygen   - Incentive spirometer, encourage ambulation   - lasix   - pro-bnp wnl for age  reporting 2 pillow orthopnea, likely related to pleural effusions, pro-bnp wnl for age and no LE edema to suggest cardiac dysfunction; if shortness of breath persists, can check echo

## 2021-06-06 NOTE — PROVIDER CONTACT NOTE (CRITICAL VALUE NOTIFICATION) - SITUATION
body fluid culture from 6/3/21 positive gram stain polymorphonuclear leukocytes seen. gram + cocci in pairs seen
Micro in [facility]). PML gram _(-)

## 2021-06-06 NOTE — PROVIDER CONTACT NOTE (CRITICAL VALUE NOTIFICATION) - ASSESSMENT
Micro in [facility]). PML gram _(-)
body fluid culture from 6/3/21 positive gram stain polymorphonuclear leukocytes seen. gram + cocci in pairs seen

## 2021-06-06 NOTE — PROGRESS NOTE ADULT - SUBJECTIVE AND OBJECTIVE BOX
Ngozi Pizarro MD   Internal Medicine PGY-1  Pager: NS: [phone] [facility]: [phone]    INCOMPLETE NOTE Ngozi Pizarro MD   Internal Medicine PGY-1  Pager: NS: [phone] [facility]: [phone]    Patient is a 66y old  Female who presents with a chief complaint of SOB (06 Jun 2021 12:04)    SUBJECTIVE / OVERNIGHT EVENTS:  No acute overnight events. Patient complaining of constipation and dysuria this morning. Patient leaking around the sandoval but refusing to have it reinserted. Given Tylenol for pain. Planned for IR biopsy tomorrow. No other acute complaints.    MEDICATIONS  (STANDING):  bisacodyl 5 milliGRAM(s) Oral at bedtime  melatonin 3 milliGRAM(s) Oral at bedtime  polyethylene glycol 3350 17 Gram(s) Oral two times a day  senna 2 Tablet(s) Oral at bedtime    MEDICATIONS  (PRN):  acetaminophen   Tablet .. 650 milliGRAM(s) Oral every 6 hours PRN Mild Pain (1 - 3), Moderate Pain (4 - 6), Severe Pain (7 - 10)    T(C): 37.3 (06-06-21 @ 06:29), Max: 37.3 (06-06-21 @ 06:29)  HR: 111 (06-06-21 @ 06:29) (111 - 126)  BP: 121/67 (06-06-21 @ 06:29) (117/72 - 121/67)  RR: 20 (06-06-21 @ 06:29) (18 - 20)  SpO2: 97% (06-06-21 @ 06:29) (97% - 98%)    CAPILLARY BLOOD GLUCOSE    I&O's Summary    05 Jun 2021 07:01  -  06 Jun 2021 07:00  --------------------------------------------------------  IN: 350 mL / OUT: 200 mL / NET: 150 mL    REVIEW OF SYSTEMS  General: No fevers/chills  Skin/Breast: No rash  Ophthalmologic: No vision changes  ENMT: No dysphagia or odynophagia  Respiratory and Thorax: + SOB, wheezing, cough  Cardiovascular: No chest pain or palpitations  Gastrointestinal: No n/v/d, No melena, No Hematochezia, + abdominal pain  Genitourinary: + dysuria, No change in urinary frequency  Musculoskeletal: No joint or muscle pains.  Neurological: No focal deficits, No headache    PHYSICAL EXAM:  GENERAL: No acute distress, well-developed  HEAD:  Atraumatic, Normocephalic  EYES: EOMI, PERRLA, conjunctiva and sclera clear  NECK: Supple, no lymphadenopathy, no JVD  CHEST/LUNG: CTAB; No wheezes, rales, or rhonchi  HEART: Regular rate and rhythm; No murmurs, rubs, or gallops  ABDOMEN: Soft, non-tender, Distended  EXTREMITIES:  2+ peripheral pulses b/l, No clubbing, cyanosis, or edema  NEUROLOGY: A&O x 3, no focal deficits  SKIN: No rashes or lesions    LABS:                        12.9   12.41 )-----------( 464      ( 06 Jun 2021 07:33 )             38.6     06-06    128<L>  |  89<L>  |  34<H>  ----------------------------<  108<H>  3.8   |  23  |  1.01    Ca    10.1      06 Jun 2021 07:33  Phos  4.3     06-06  Mg     2.1     06-06    RADIOLOGY & ADDITIONAL TESTS:    Imaging Personally Reviewed: Reviewed     Consultant(s) Notes Reviewed: IR, ID, Gyn    Care Discussed with Consultants/Other Providers: IR, ID, Gyn

## 2021-06-06 NOTE — DIETITIAN INITIAL EVALUATION ADULT. - PROBLEM SELECTOR PLAN 2
Likely causing worsening SOB and from translocation from malignant ascites.  continue to monitor  can trial lasix in am if continued sob

## 2021-06-06 NOTE — DIETITIAN INITIAL EVALUATION ADULT. - OTHER INFO
At time of visit patient expressed discomfort. In-house patient reports continued poor po intake, stating that she gets full very quickly. Patient endorses that she has not had a BMx10 days. At time of visit RD noted that patient consumed 1/2 of the oral supplement Ensure Enlive at bedside. Patient likely continuing to meet <50% of estimated nutrient needs in-house.    UBW: 150 lb per patient report  Admit weight: 143 lbs    Given patient with ascites/ abdominal distention, unable to assess true weight loss amount.    RD encouraged patient to take try to small sips and bites of food throughout the day with patient to prevent weight loss during hospital stay.

## 2021-06-06 NOTE — PROGRESS NOTE ADULT - ATTENDING COMMENTS
66 y.o. F w/ a hx of endometriosis, HLD who presents with abdominal distension, pain, dysuria, found to have new ascites and peritoneal carcinomatosis.     # Peritoneal carcinomatosis w/ ascites: S/p paracentesis w/ 2.5L fluid removal, para fluid gram stain now corrected- no organisms seen. DC Zosyn.  ID following. Cytology cannot be added on. Gyn-onc, Oncology following. Plan for IR guided LN Bx and possible paracentesis tomorrow.   # Dyspnea: Likely 2/ pleural effusions and abdominal distension. Wean O2.  # Tachycardia: CTA negative for PE, poor PO intake. Fluid challenge with albumin and monitor HR for improvement.

## 2021-06-07 NOTE — PROGRESS NOTE ADULT - PROBLEM SELECTOR PLAN 1
Moderate volume ascites in CT. SAAG was 1.4 which in setting of imaging points to malignant ascites with portal hypertension. Likely 2/2 ovarian primary. Tap w/GPC in pairs, ?contaminant, will treat with Zosyn per ID   - Cell count cannot be added on   -onc and gyn-onc are following  - check CEA, CA  -urine culture  [ ] Plan for repeat para and bx Moderate volume ascites in CT. SAAG was 1.4 which in setting of imaging points to malignant ascites with portal hypertension. Likely 2/2 ovarian primary.   Para fluid Gram stain + GPC in pairs, not present on review. DC abx.   - Cell count cannot be added on   -onc and gyn-onc are following  - CEA, CA 19-9 elevated   -urine culture  [ ] Plan for repeat para and bx

## 2021-06-07 NOTE — PROGRESS NOTE ADULT - ATTENDING COMMENTS
66 y.o. F w/ a hx of endometriosis, HLD who presents with abdominal distension, pain, dysuria, found to have new ascites and peritoneal carcinomatosis c/f GI/ primary.     # Peritoneal carcinomatosis w/ ascites: Ascites fluid gram stain and culture negative, monitor off abx. Plan for IR guided LN Bx and paracentesis tomorrow. Gyn-onc, Oncology following.   # Dyspnea: Improving, likely 2/2 pleural effusions, ascites. Wean O2 as able.   # Tachycardia: CTA negative for PE, no infection noted, has mild pain controlled w/ Tylenol. Fluid challenge w/ 25% albumin x 2 and monitor HR for improvement. Check TTE.

## 2021-06-07 NOTE — PROGRESS NOTE ADULT - PROBLEM SELECTOR PLAN 8
History of constipation earlier on admission, previously on bowel regimen, received intermittent enema  - will hold bowel regimen for tonight   - reintroduce senna and miralax prn

## 2021-06-07 NOTE — PROGRESS NOTE ADULT - SUBJECTIVE AND OBJECTIVE BOX
Patient is a 66y old  Female who presents with a chief complaint of SOB (06 Jun 2021 12:39)      SUBJECTIVE / OVERNIGHT EVENTS:    MEDICATIONS  (STANDING):  bisacodyl 5 milliGRAM(s) Oral at bedtime  melatonin 3 milliGRAM(s) Oral at bedtime  ondansetron Injectable 4 milliGRAM(s) IV Push once  polyethylene glycol 3350 17 Gram(s) Oral two times a day  senna 2 Tablet(s) Oral at bedtime    MEDICATIONS  (PRN):  acetaminophen   Tablet .. 650 milliGRAM(s) Oral every 6 hours PRN Mild Pain (1 - 3), Moderate Pain (4 - 6), Severe Pain (7 - 10)      Vital Signs Last 24 Hrs  T(C): 36.5 (07 Jun 2021 06:08), Max: 36.7 (06 Jun 2021 15:00)  T(F): 97.7 (07 Jun 2021 06:08), Max: 98.1 (06 Jun 2021 15:00)  HR: 121 (07 Jun 2021 06:08) (121 - 126)  BP: 114/68 (07 Jun 2021 06:08) (110/65 - 135/70)  BP(mean): --  RR: 18 (07 Jun 2021 06:08) (18 - 18)  SpO2: 95% (07 Jun 2021 06:08) (93% - 95%)  CAPILLARY BLOOD GLUCOSE        I&O's Summary    06 Jun 2021 07:01  -  07 Jun 2021 07:00  --------------------------------------------------------  IN: 1130 mL / OUT: 440 mL / NET: 690 mL        PHYSICAL EXAM:  GENERAL: NAD, well-developed  HEAD:  Atraumatic, Normocephalic  EYES: EOMI, PERRLA, conjunctiva and sclera clear  NECK: Supple, No JVD  CHEST/LUNG: Clear to auscultation bilaterally; No wheeze  HEART: Regular rate and rhythm; No murmurs, rubs, or gallops  ABDOMEN: Soft, Nontender, Nondistended; Bowel sounds present  EXTREMITIES:  2+ Peripheral Pulses, No clubbing, cyanosis, or edema  PSYCH: AAOx3  NEUROLOGY: non-focal  SKIN: No rashes or lesions    LABS:                        12.9   12.41 )-----------( 464      ( 06 Jun 2021 07:33 )             38.6     06-06    128<L>  |  89<L>  |  34<H>  ----------------------------<  108<H>  3.8   |  23  |  1.01    Ca    10.1      06 Jun 2021 07:33  Phos  4.3     06-06  Mg     2.1     06-06                RADIOLOGY & ADDITIONAL TESTS:    Imaging Personally Reviewed:    Consultant(s) Notes Reviewed:      Care Discussed with Consultants/Other Providers:   Patient is a 66y old  Female who presents with a chief complaint of SOB (06 Jun 2021 12:39)      SUBJECTIVE / OVERNIGHT EVENTS:    Pt reports some back pain, continued abdominal distension, no other acute events overnight. Reports multiple bowel movements since enema yesterday. Denies fevers, chills, chest p/p, sob, n/v/d.     MEDICATIONS  (STANDING):  bisacodyl 5 milliGRAM(s) Oral at bedtime  melatonin 3 milliGRAM(s) Oral at bedtime  ondansetron Injectable 4 milliGRAM(s) IV Push once  polyethylene glycol 3350 17 Gram(s) Oral two times a day  senna 2 Tablet(s) Oral at bedtime    MEDICATIONS  (PRN):  acetaminophen   Tablet .. 650 milliGRAM(s) Oral every 6 hours PRN Mild Pain (1 - 3), Moderate Pain (4 - 6), Severe Pain (7 - 10)      Vital Signs Last 24 Hrs  T(C): 36.5 (07 Jun 2021 06:08), Max: 36.7 (06 Jun 2021 15:00)  T(F): 97.7 (07 Jun 2021 06:08), Max: 98.1 (06 Jun 2021 15:00)  HR: 121 (07 Jun 2021 06:08) (121 - 126)  BP: 114/68 (07 Jun 2021 06:08) (110/65 - 135/70)  BP(mean): --  RR: 18 (07 Jun 2021 06:08) (18 - 18)  SpO2: 95% (07 Jun 2021 06:08) (93% - 95%)  CAPILLARY BLOOD GLUCOSE    I&O's Summary    06 Jun 2021 07:01  -  07 Jun 2021 07:00  --------------------------------------------------------  IN: 1130 mL / OUT: 440 mL / NET: 690 mL        PHYSICAL EXAM:  GENERAL: NAD, well-developed  HEAD:  Atraumatic, Normocephalic  EYES: EOMI, PERRLA, conjunctiva and sclera clear  NECK: Supple, No JVD  CHEST/LUNG: Clear to auscultation bilaterally; No wheeze  HEART: Regular rate and rhythm; No murmurs, rubs, or gallops  ABDOMEN: Soft, Nontender, Nondistended; Bowel sounds present  EXTREMITIES:  2+ Peripheral Pulses, No clubbing, cyanosis, or edema  PSYCH: AAOx3  NEUROLOGY: non-focal  SKIN: No rashes or lesions    LABS:                        12.9   12.41 )-----------( 464      ( 06 Jun 2021 07:33 )             38.6     06-06    128<L>  |  89<L>  |  34<H>  ----------------------------<  108<H>  3.8   |  23  |  1.01    Ca    10.1      06 Jun 2021 07:33  Phos  4.3     06-06  Mg     2.1     06-06                RADIOLOGY & ADDITIONAL TESTS:    Imaging Personally Reviewed:    Consultant(s) Notes Reviewed:      Care Discussed with Consultants/Other Providers:

## 2021-06-07 NOTE — PROGRESS NOTE ADULT - SUBJECTIVE AND OBJECTIVE BOX
Follow Up:      Inverval History/ROS:Patient is a 66y old  Female who presents with a chief complaint of SOB (07 Jun 2021 07:20)      Allergies    sulfa drugs (Pruritus; Rash)    Intolerances        ANTIMICROBIALS:      OTHER MEDS:  acetaminophen   Tablet .. 650 milliGRAM(s) Oral every 6 hours PRN  bisacodyl 5 milliGRAM(s) Oral at bedtime  melatonin 3 milliGRAM(s) Oral at bedtime  ondansetron Injectable 4 milliGRAM(s) IV Push once      Vital Signs Last 24 Hrs  T(C): 36.5 (07 Jun 2021 06:08), Max: 36.7 (06 Jun 2021 15:00)  T(F): 97.7 (07 Jun 2021 06:08), Max: 98.1 (06 Jun 2021 15:00)  HR: 121 (07 Jun 2021 06:08) (121 - 126)  BP: 114/68 (07 Jun 2021 06:08) (110/65 - 135/70)  BP(mean): --  RR: 18 (07 Jun 2021 06:08) (18 - 18)  SpO2: 95% (07 Jun 2021 06:08) (93% - 95%)    PHYSICAL EXAM:  General: [ ] non-toxic  HEAD/EYES: [ ] PERRL [ ] white sclera [ ] icterus  ENT:  [ ] normal [ ] supple [ ] thrush [ ] pharyngeal exudate  Cardiovascular:   [ ] murmur [ ] normal [ ] PPM/AICD  Respiratory:  [ ] clear to ausculation bilaterally  GI:  [ ] soft, non-tender, normal bowel sounds  :  [ ] sandoval [ ] no CVA tenderness   Musculoskeletal:  [ ] no synovitis  Neurologic:  [ ] non-focal exam   Skin:  [ ] no rash  Lymph: [ ] no lymphadenopathy  Psychiatric:  [ ] appropriate affect [ ] alert & oriented  Lines:  [ ] no phlebitis [ ] central line                                12.9   9.67  )-----------( 430      ( 07 Jun 2021 08:20 )             38.1       06-07    128<L>  |  87<L>  |  40<H>  ----------------------------<  74  4.0   |  23  |  0.99    Ca    10.2      07 Jun 2021 08:20  Phos  3.7     06-07  Mg     2.3     06-07    TPro  5.8<L>  /  Alb  2.4<L>  /  TBili  0.6  /  DBili  x   /  AST  29  /  ALT  11  /  AlkPhos  110  06-07          MICROBIOLOGY:Culture Results:   No growth (06-04-21 @ 15:13)  Culture Results:   No growth (06-03-21 @ 23:34)      RADIOLOGY:

## 2021-06-07 NOTE — PROGRESS NOTE ADULT - PROBLEM SELECTOR PLAN 4
Patient complaining of some mild dysuria associated with trouble urinating. sandoval placed in the ED w/5cc dark yellow urine output per RN notes  - Urine culture  -on Zosyn Patient complaining of some mild dysuria associated with trouble urinating. sandoval placed in the ED w/5cc dark yellow urine output per RN notes  - Urine culture ngtd

## 2021-06-08 NOTE — PROGRESS NOTE ADULT - ATTENDING COMMENTS
65 y/o female with PMH of HLD presents to [facility] ED for SOB, cough, weakness and fatigue 2/2 ascites found to have imaging concerning new dx of metastatic malignancy, suspect GYN primary  -CT chest/abd/pelvis shows moderate to large b/l pleural effusions with near complete atelectasis of b/l lower lobes, enlarged and heterogeneous appearance of the bilateral adnexa/ovaries, presence of peritoneal carcinomatosis with innumerable peritoneal implants. There is moderate ascites, RP and cardiophrenic adenopathy as well.   Patient is s/p paracentesis in ER with removal of 2.5L of fluid but not sent for cytology  She had repeat paracentesis 6/8 with omental biopsy and pathology is pending.  She has been seen by [facility] who recommend neoadjuvant chemo. She has a poor PS but may improve if she responds to chemo.  Will follow with you and arrange outpatient follow up at [facility] on [place].

## 2021-06-08 NOTE — PROGRESS NOTE ADULT - SUBJECTIVE AND OBJECTIVE BOX
INTERVAL HPI/OVERNIGHT EVENTS:  Patient S&E at bedside. S/p paracentesis and omental biopsy. Patient sleeping after procedure. Not conversive. Appears comfortable.      VITAL SIGNS:  T(F): 98 (06-08-21 @ 14:18)  HR: 138 (06-08-21 @ 14:18)  BP: 116/63 (06-08-21 @ 14:18)  RR: 20 (06-08-21 @ 14:18)  SpO2: 96% (06-08-21 @ 14:18)  Wt(kg): --    PHYSICAL EXAM:    Constitutional: NAD. temporal wasting   Eyes: EOMI, sclera non-icteric  Neck: supple  Respiratory: CTAB, no wheezes or crackles   Cardiovascular: tachycardic   Gastrointestinal: distended, periumbilical mass, not tender to palpation   Extremities: no cyanosis, clubbing or edema   Neurological: resting comfortable, limited exam. Moving extremities       MEDICATIONS  (STANDING):  albumin human 25% IVPB 100 milliLiter(s) IV Intermittent every 6 hours  bisacodyl 5 milliGRAM(s) Oral at bedtime  enoxaparin Injectable 40 milliGRAM(s) SubCutaneous daily  folic acid 1 milliGRAM(s) Oral daily  melatonin 3 milliGRAM(s) Oral at bedtime  ondansetron Injectable 4 milliGRAM(s) IV Push once    MEDICATIONS  (PRN):  acetaminophen   Tablet .. 650 milliGRAM(s) Oral every 6 hours PRN Mild Pain (1 - 3), Moderate Pain (4 - 6), Severe Pain (7 - 10)      Allergies    sulfa drugs (Pruritus; Rash)    Intolerances        LABS:                        11.4   10.57 )-----------( 436      ( 08 Jun 2021 06:57 )             35.2     06-08    130<L>  |  89<L>  |  57<H>  ----------------------------<  100<H>  3.7   |  24  |  1.13    Ca    10.8<H>      08 Jun 2021 06:57  Phos  3.7     06-07  Mg     2.3     06-07    TPro  6.1  /  Alb  3.0<L>  /  TBili  0.6  /  DBili  x   /  AST  30  /  ALT  10  /  AlkPhos  101  06-08    PT/INR - ( 08 Jun 2021 06:57 )   PT: 16.4 sec;   INR: 1.46 ratio         PTT - ( 08 Jun 2021 06:57 )  PTT:32.7 sec      RADIOLOGY & ADDITIONAL TESTS:  Studies reviewed.

## 2021-06-08 NOTE — DISCHARGE NOTE PROVIDER - HOSPITAL COURSE
67 y/o female with PMH of HLD who presents to [facility] ED for SOB, couh, weakness and fatigue 2/2 ascites. Patient states she started began feeling increasing fatigue roughly 6 months ago. 4 weeks ago, she received her 1st COVID Moderna vaccination, and then she felt increase fatigue for 2-3 days. Shortly thereafter, 3 weeks ago, she noticed an increase in her abdominal size, which has becoming increasing large. Her abdominal size increased 'quickly', and causing decreased appetite, urinary output and bowel movements. Due to the increasing abdominal pressure, she reports experiencing bouts of urinary incontinence. She further developed epigastric pain. For the last week, she states a history of dry heaves and cough productive of clear phlegm. She states history of hernia, which has been worsened since the increasing size of her belly. Additionally, she tells of burning pain in her right leg, and burning on urination. She denies chest pain.    Last pap smear 15 years ago reportedly wnl; Last mammogram 5 years ago reportedly wnl; No history of colonoscopy in past (pt refused), negative occult testing years ago.    In the Ed the patient was noted to have abdominal distention and paracentesis performed draining 2.5L. CT CAP notable for ascites and peritoneal carcinomatosis, as well as enlarged pelvic masses.

## 2021-06-08 NOTE — PROGRESS NOTE ADULT - PROBLEM SELECTOR PLAN 1
Moderate volume ascites in CT. SAAG was 1.4 which in setting of imaging points to malignant ascites with portal hypertension. Likely 2/2 ovarian primary.   Para fluid Gram stain + GPC in pairs, not present on review. DC abx.   - Cell count cannot be added on   -onc and gyn-onc are following  - CEA, CA 19-9 elevated   -urine culture  [ ] Plan for repeat para and bx

## 2021-06-08 NOTE — PROGRESS NOTE ADULT - SUBJECTIVE AND OBJECTIVE BOX
Patient is a 66y old  Female who presents with a chief complaint of SOB (07 Jun 2021 10:47)      SUBJECTIVE / OVERNIGHT EVENTS:    MEDICATIONS  (STANDING):  bisacodyl 5 milliGRAM(s) Oral at bedtime  melatonin 3 milliGRAM(s) Oral at bedtime  ondansetron Injectable 4 milliGRAM(s) IV Push once    MEDICATIONS  (PRN):  acetaminophen   Tablet .. 650 milliGRAM(s) Oral every 6 hours PRN Mild Pain (1 - 3), Moderate Pain (4 - 6), Severe Pain (7 - 10)      Vital Signs Last 24 Hrs  T(C): 36.6 (08 Jun 2021 05:30), Max: 36.6 (07 Jun 2021 13:28)  T(F): 97.9 (08 Jun 2021 05:30), Max: 97.9 (07 Jun 2021 13:28)  HR: 124 (08 Jun 2021 05:30) (118 - 126)  BP: 114/73 (08 Jun 2021 05:30) (112/70 - 115/68)  BP(mean): --  RR: 18 (08 Jun 2021 05:30) (18 - 18)  SpO2: 97% (08 Jun 2021 05:30) (95% - 97%)  CAPILLARY BLOOD GLUCOSE        I&O's Summary    07 Jun 2021 07:01  -  08 Jun 2021 07:00  --------------------------------------------------------  IN: 400 mL / OUT: 0 mL / NET: 400 mL        PHYSICAL EXAM:  GENERAL: NAD, well-developed  HEAD:  Atraumatic, Normocephalic  EYES: EOMI, PERRLA, conjunctiva and sclera clear  NECK: Supple, No JVD  CHEST/LUNG: Clear to auscultation bilaterally; No wheeze  HEART: Regular rate and rhythm; No murmurs, rubs, or gallops  ABDOMEN: Tight, distended, no bowel sounds present   EXTREMITIES:  2+ Peripheral Pulses, No clubbing, cyanosis, or edema  PSYCH: AAOx3  NEUROLOGY: non-focal  SKIN: No rashes or lesions    LABS:                        11.4   10.57 )-----------( 436      ( 08 Jun 2021 06:57 )             35.2     06-08    130<L>  |  89<L>  |  57<H>  ----------------------------<  100<H>  3.7   |  24  |  1.13    Ca    10.8<H>      08 Jun 2021 06:57  Phos  3.7     06-07  Mg     2.3     06-07    TPro  6.1  /  Alb  3.0<L>  /  TBili  0.6  /  DBili  x   /  AST  30  /  ALT  10  /  AlkPhos  101  06-08    PT/INR - ( 08 Jun 2021 06:57 )   PT: 16.4 sec;   INR: 1.46 ratio         PTT - ( 08 Jun 2021 06:57 )  PTT:32.7 sec          RADIOLOGY & ADDITIONAL TESTS:    Imaging Personally Reviewed:    Consultant(s) Notes Reviewed:      Care Discussed with Consultants/Other Providers:

## 2021-06-08 NOTE — DISCHARGE NOTE PROVIDER - DETAILS OF MALNUTRITION DIAGNOSIS/DIAGNOSES
This patient has been assessed with a concern for Malnutrition and was treated during this hospitalization for the following Nutrition diagnosis/diagnoses:     -  06/06/2021: Mild protein-calorie malnutrition

## 2021-06-08 NOTE — PROGRESS NOTE ADULT - PROBLEM SELECTOR PLAN 4
Patient complaining of some mild dysuria associated with trouble urinating. sandoval placed in the ED w/5cc dark yellow urine output per RN notes  - Urine culture ngtd

## 2021-06-08 NOTE — CHART NOTE - NSCHARTNOTEFT_GEN_A_CORE
Pre-Interventional Radiology Procedure Note    NU KEITA | [id number]    06-08-21 @ 07:32    Interventional Radiology Attending Physician:     Ordering Attending Physician: Mathew, Merlin    Diagnosis/Indication: Patient is a 66y old  Female who presents with a chief complaint of SOB (07 Jun 2021 10:47)      Procedure: umbilical lymph node biopsy with therapeutic paracentesis     66y    Female    PAST MEDICAL & SURGICAL HISTORY:  Hyperlipidemia    Endometriosis    H/O cleft lip  repair as a young child    CBC Full  -  ( 08 Jun 2021 06:57 )  WBC Count : 10.57 K/uL  RBC Count : 4.06 M/uL  Hemoglobin : 11.4 g/dL  Hematocrit : 35.2 %  Platelet Count - Automated : 436 K/uL  Mean Cell Volume : 86.7 fL  Mean Cell Hemoglobin : 28.1 pg  Mean Cell Hemoglobin Concentration : 32.4 gm/dL  Auto Neutrophil # : x  Auto Lymphocyte # : x  Auto Monocyte # : x  Auto Eosinophil # : x  Auto Basophil # : x  Auto Neutrophil % : x  Auto Lymphocyte % : x  Auto Monocyte % : x  Auto Eosinophil % : x  Auto Basophil % : x    06-07    128<L>  |  87<L>  |  40<H>  ----------------------------<  74  4.0   |  23  |  0.99    Ca    10.2      07 Jun 2021 08:20  Phos  3.7     06-07  Mg     2.3     06-07    TPro  5.8<L>  /  Alb  2.4<L>  /  TBili  0.6  /  DBili  x   /  AST  29  /  ALT  11  /  AlkPhos  110  06-07    PT/INR - ( 07 Jun 2021 08:20 )   PT: 16.2 sec;   INR: 1.43 ratio         PTT - ( 07 Jun 2021 08:20 )  PTT:35.1 sec

## 2021-06-08 NOTE — PROGRESS NOTE ADULT - ATTENDING COMMENTS
66 y.o. F w/ a hx of endometriosis, HLD who presents with abdominal distension, pain, dysuria, found to have new ascites and peritoneal carcinomatosis c/f GI/ primary.     # Peritoneal carcinomatosis w/ ascites: Plan for IR guided LN Bx and paracentesis today. Send off ascites cytology. Gyn-onc, Oncology following.   # Dyspnea: Improving, likely 2/2 pleural effusions, ascites. Wean O2 as able.   # Tachycardia: CTA negative for PE, no infection noted, has mild pain controlled w/ Tylenol. No improvement with fluid challenge. Check TTE. 66 y.o. F w/ a hx of endometriosis, HLD who presents with abdominal distension, pain, dysuria, found to have new ascites and peritoneal carcinomatosis c/f GI/ primary.     # Peritoneal carcinomatosis w/ ascites: Plan for IR guided LN Bx and paracentesis today. Send off ascites cytology. Gyn-onc, Oncology following.   # Dyspnea: Improving, likely 2/2 pleural effusions, ascites. Wean O2 as able.   # Tachycardia: CTA negative for PE, no infection noted, has mild pain controlled w/ Tylenol. No improvement with fluid challenge. Check TTE.  # Folic acid deficiency: Start Folic acid

## 2021-06-09 NOTE — PROGRESS NOTE ADULT - ATTENDING COMMENTS
66 y.o. F w/ a hx of endometriosis, HLD who presents with abdominal distension, pain, dysuria, found to have new ascites and peritoneal carcinomatosis c/f GI/ primary s/p IR guided omentum bx and paracentesis 6/8.   Patient appears more lethargic today, no specific complaints. Abdomen continues to be distended.     # Peritoneal carcinomatosis w/ ascites: S/P omentum bx, path pending. Ascites fluid >250PMNs, start CTX. Oncology following.   # Dyspnea: Improving, likely 2/2 pleural effusions, ascites. Wean O2 as able.   # Tachycardia: CTA negative for PE, no infection noted, has mild pain controlled w/ Tylenol. Cont fluid challenge. Awaiting TTE.  # Folic acid deficiency: Cont Folic acid. 66 y.o. F w/ a hx of endometriosis, HLD who presents with abdominal distension, pain, dysuria, found to have new ascites and peritoneal carcinomatosis c/f GI/ primary s/p IR guided omentum bx and paracentesis 6/8.   Patient appears more lethargic today, no specific complaints. Abdomen continues to be distended.     # Peritoneal carcinomatosis w/ ascites: S/P omentum bx, path pending. Ascites fluid >250PMNs though pt has peritoneal carcinomatosis. Start CTX, will d/w ID. No cultures obtained during para. Oncology following.   # Dyspnea: Improving, likely 2/2 pleural effusions, ascites. Wean O2 as able.   # Tachycardia: CTA negative for PE, no infection noted, has mild pain controlled w/ Tylenol. Cont fluid challenge. Awaiting TTE.  # Folic acid deficiency: Cont Folic acid. 66 y.o. F w/ a hx of endometriosis, HLD who presents with abdominal distension, pain, dysuria, found to have new ascites and peritoneal carcinomatosis c/f GI/ primary s/p IR guided omentum bx and paracentesis 6/8.   Patient appears more lethargic today, no specific complaints. Abdomen continues to be distended. Discussed plan of care with patient and brother in law Erwin including high suspicion for  malignancy causing malignant ascites, PT recommendation for rehab and dispo planning.     # Peritoneal carcinomatosis w/ ascites: S/P omentum bx, path pending. Ascites fluid >250PMNs though pt has peritoneal carcinomatosis. Start CTX, will d/w ID. No cultures obtained during para. Oncology following.   # Dyspnea: Improving, likely 2/2 pleural effusions, ascites. Wean O2 as able.   # Tachycardia: CTA negative for PE, no infection noted, has mild pain controlled w/ Tylenol. Cont fluid challenge. Awaiting TTE.  # Folic acid deficiency: Cont Folic acid.

## 2021-06-09 NOTE — PROGRESS NOTE ADULT - PROBLEM SELECTOR PLAN 4
Patient complaining of some mild dysuria associated with trouble urinating. sandoval placed in the ED w/5cc dark yellow urine output per RN notes  - Urine culture ngtd 3 weeks of fatigue weakness sob associated with abdominal distention. CT chest without evidence of PE though patient tachycardic. Dyspnea likely 2/2 abdominal distention/ascites and pleural effusions likely from translocation of ascites. Improved after 2.5L paracentesis drain in the ED.   - continue with oxygen as needed  - Incentive spirometer, encourage ambulation   - lasix   - pro-bnp wnl for age  reporting 2 pillow orthopnea, likely related to pleural effusions, pro-bnp wnl for age and no LE edema to suggest cardiac dysfunction; if shortness of breath persists, can check echo

## 2021-06-09 NOTE — PROGRESS NOTE ADULT - PROBLEM SELECTOR PLAN 3
3 weeks of fatigue weakness sob associated with abdominal distention. CT chest without evidence of PE though patient tachycardic. Dyspnea likely 2/2 abdominal distention/ascites and pleural effusions likely from translocation of ascites. Improved after 2.5L paracentesis drain in the ED.   - continue with oxygen as needed  - Incentive spirometer, encourage ambulation   - lasix   - pro-bnp wnl for age  reporting 2 pillow orthopnea, likely related to pleural effusions, pro-bnp wnl for age and no LE edema to suggest cardiac dysfunction; if shortness of breath persists, can check echo Likely causing worsening SOB and from translocation from malignant ascites.  continue to monitor  -lasix 20 mg IV PRN

## 2021-06-09 NOTE — PROGRESS NOTE ADULT - SUBJECTIVE AND OBJECTIVE BOX
Follow Up:      Inverval History/ROS: Patient is a 66y old  Female who presents with a chief complaint of SOB (09 Jun 2021 07:19)    C/o abd discomfort  distention      Allergies    sulfa drugs (Pruritus; Rash)    Intolerances        ANTIMICROBIALS:  cefTRIAXone   IVPB 2000 every 24 hours      OTHER MEDS:  acetaminophen   Tablet .. 650 milliGRAM(s) Oral every 6 hours PRN  albumin human 25% IVPB 100 milliLiter(s) IV Intermittent every 6 hours  bisacodyl 5 milliGRAM(s) Oral at bedtime  enoxaparin Injectable 40 milliGRAM(s) SubCutaneous daily  famotidine    Tablet 20 milliGRAM(s) Oral daily  folic acid 1 milliGRAM(s) Oral daily  melatonin 3 milliGRAM(s) Oral at bedtime  ondansetron Injectable 4 milliGRAM(s) IV Push once      Vital Signs Last 24 Hrs  T(C): 36.7 (09 Jun 2021 06:47), Max: 36.7 (09 Jun 2021 06:47)  T(F): 98 (09 Jun 2021 06:47), Max: 98 (09 Jun 2021 06:47)  HR: 128 (09 Jun 2021 06:47) (128 - 129)  BP: 126/65 (09 Jun 2021 06:47) (110/57 - 126/65)  BP(mean): --  RR: 21 (09 Jun 2021 06:47) (17 - 21)  SpO2: 94% (09 Jun 2021 06:47) (92% - 94%)    PHYSICAL EXAM:  General: [x ] cachexia  HEAD/EYES: [ ] PERRL [x ] white sclera [ ] icterus  ENT:  [ ] normal [x ] supple [ ] thrush [ ] pharyngeal exudate  Cardiovascular:   [ ] murmur [x ] normal [ ] PPM/AICD  Respiratory:  [ x] clear to ausculation bilaterally  GI:  [ x] soft, distended, normal bowel sounds  :  [ ] sandoval [ ] no CVA tenderness   Musculoskeletal:  [ ] no synovitis  Neurologic:  [ ] non-focal exam   Skin:  [x ] no rash  Lymph: [x ] no lymphadenopathy  Psychiatric:  x[ ] appropriate affect [ ] alert & oriented  Lines:  [x ] no phlebitis [ ] central line                                12.1   12.90 )-----------( 496      ( 09 Jun 2021 08:22 )             36.9       06-09    134<L>  |  88<L>  |  67<H>  ----------------------------<  108<H>  3.7   |  26  |  0.83    Ca    11.5<H>      09 Jun 2021 08:22  Phos  2.5     06-09  Mg     2.8     06-09    TPro  6.1  /  Alb  3.5  /  TBili  0.7  /  DBili  x   /  AST  30  /  ALT  8   /  AlkPhos  110  06-09          MICROBIOLOGY:Culture Results:   Testing in progress (06-08-21 @ 18:39)  Culture Results:   No growth (06-04-21 @ 15:13)  Culture Results:   No growth at 5 days (06-03-21 @ 15:54)      RADIOLOGY:

## 2021-06-09 NOTE — PROGRESS NOTE ADULT - SUBJECTIVE AND OBJECTIVE BOX
INTERVAL HPI/OVERNIGHT EVENTS:  Patient S&E at bedside. No o/n events. Patient much more awake and alert today. No specific complaints. Discussed awaiting biopsy results.     VITAL SIGNS:  T(F): 98.3 (06-09-21 @ 15:12)  HR: 129 (06-09-21 @ 15:12)  BP: 129/70 (06-09-21 @ 15:12)  RR: 20 (06-09-21 @ 15:12)  SpO2: 95% (06-09-21 @ 15:12)  Wt(kg): --    PHYSICAL EXAM:    Constitutional: NAD. temporal wasting   Eyes: EOMI, sclera non-icteric  Neck: supple  Respiratory: CTAB, no wheezes or crackles   Cardiovascular: tachycardic   Gastrointestinal: distended, periumbilical mass, not tender to palpation   Extremities: no cyanosis, clubbing or edema   Neurological: no focal deficits     MEDICATIONS  (STANDING):  bisacodyl 5 milliGRAM(s) Oral at bedtime  cefTRIAXone   IVPB 2000 milliGRAM(s) IV Intermittent every 24 hours  enoxaparin Injectable 40 milliGRAM(s) SubCutaneous daily  famotidine    Tablet 20 milliGRAM(s) Oral daily  folic acid 1 milliGRAM(s) Oral daily  melatonin 3 milliGRAM(s) Oral at bedtime  ondansetron Injectable 4 milliGRAM(s) IV Push once    MEDICATIONS  (PRN):  acetaminophen   Tablet .. 650 milliGRAM(s) Oral every 6 hours PRN Mild Pain (1 - 3), Moderate Pain (4 - 6), Severe Pain (7 - 10)      Allergies    sulfa drugs (Pruritus; Rash)    Intolerances        LABS:                        12.1   12.90 )-----------( 496      ( 09 Jun 2021 08:22 )             36.9     06-09    134<L>  |  88<L>  |  67<H>  ----------------------------<  108<H>  3.7   |  26  |  0.83    Ca    11.5<H>      09 Jun 2021 08:22  Phos  2.5     06-09  Mg     2.8     06-09    TPro  6.1  /  Alb  3.5  /  TBili  0.7  /  DBili  x   /  AST  30  /  ALT  8   /  AlkPhos  110  06-09    PT/INR - ( 08 Jun 2021 06:57 )   PT: 16.4 sec;   INR: 1.46 ratio         PTT - ( 08 Jun 2021 06:57 )  PTT:32.7 sec      RADIOLOGY & ADDITIONAL TESTS:  Studies reviewed.

## 2021-06-09 NOTE — PROGRESS NOTE ADULT - PROBLEM SELECTOR PLAN 2
Likely causing worsening SOB and from translocation from malignant ascites.  continue to monitor  -lasix 20 mg IV PRN Likely due to pleural effusions, ascites, discomfort, vs hypovolemia, low likelihood of PE,   - CTA negative for PE  - albumin 25% PRN  - tsh wnl   - continue to monitor

## 2021-06-09 NOTE — PROGRESS NOTE ADULT - ATTENDING COMMENTS
65 y/o female with PMH of HLD presents to [facility] ED for SOB, cough, weakness and fatigue 2/2 ascites found to have imaging concerning new dx of metastatic malignancy, suspect GYN primary  CT chest/abd/pelvis shows moderate to large b/l pleural effusions with near complete atelectasis of b/l lower lobes, enlarged and heterogeneous appearance of the bilateral adnexa/ovaries, presence of peritoneal carcinomatosis with innumerable peritoneal implants. There is moderate ascites, RP and cardiophrenic adenopathy as well.   Patient is s/p paracentesis in ER with removal of 2.5L of fluid but not sent for cytology  She had repeat paracentesis 6/8 with omental biopsy and pathology is pending.  She has been seen by [facility] who recommend neoadjuvant chemo. She has a poor PS but may improve if she responds to chemo.  Will follow with you and arrange outpatient follow up at [facility] on discharge.

## 2021-06-09 NOTE — PROGRESS NOTE ADULT - SUBJECTIVE AND OBJECTIVE BOX
Patient is a 66y old  Female who presents with a chief complaint of SOB (08 Jun 2021 18:26)      SUBJECTIVE / OVERNIGHT EVENTS:    MEDICATIONS  (STANDING):  albumin human 25% IVPB 100 milliLiter(s) IV Intermittent every 6 hours  bisacodyl 5 milliGRAM(s) Oral at bedtime  enoxaparin Injectable 40 milliGRAM(s) SubCutaneous daily  folic acid 1 milliGRAM(s) Oral daily  melatonin 3 milliGRAM(s) Oral at bedtime  ondansetron Injectable 4 milliGRAM(s) IV Push once    MEDICATIONS  (PRN):  acetaminophen   Tablet .. 650 milliGRAM(s) Oral every 6 hours PRN Mild Pain (1 - 3), Moderate Pain (4 - 6), Severe Pain (7 - 10)      Vital Signs Last 24 Hrs  T(C): 36.7 (09 Jun 2021 06:47), Max: 36.7 (08 Jun 2021 14:18)  T(F): 98 (09 Jun 2021 06:47), Max: 98 (08 Jun 2021 14:18)  HR: 128 (09 Jun 2021 06:47) (128 - 140)  BP: 126/65 (09 Jun 2021 06:47) (110/57 - 126/65)  BP(mean): --  RR: 21 (09 Jun 2021 06:47) (17 - 21)  SpO2: 94% (09 Jun 2021 06:47) (92% - 98%)  CAPILLARY BLOOD GLUCOSE        I&O's Summary      PHYSICAL EXAM:  GENERAL: NAD, well-developed  HEAD:  Atraumatic, Normocephalic  EYES: EOMI, PERRLA, conjunctiva and sclera clear  NECK: Supple, No JVD  CHEST/LUNG: Clear to auscultation bilaterally; No wheeze  HEART: Regular rate and rhythm; No murmurs, rubs, or gallops  ABDOMEN: Tight, distended, no bowel sounds present   EXTREMITIES:  2+ Peripheral Pulses, No clubbing, cyanosis, or edema  PSYCH: AAOx3  NEUROLOGY: non-focal  SKIN: No rashes or lesions    LABS:                        11.4   10.57 )-----------( 436      ( 08 Jun 2021 06:57 )             35.2     06-08    130<L>  |  89<L>  |  57<H>  ----------------------------<  100<H>  3.7   |  24  |  1.13    Ca    10.8<H>      08 Jun 2021 06:57  Phos  3.7     06-07  Mg     2.3     06-07    TPro  6.1  /  Alb  3.0<L>  /  TBili  0.6  /  DBili  x   /  AST  30  /  ALT  10  /  AlkPhos  101  06-08    PT/INR - ( 08 Jun 2021 06:57 )   PT: 16.4 sec;   INR: 1.46 ratio         PTT - ( 08 Jun 2021 06:57 )  PTT:32.7 sec          RADIOLOGY & ADDITIONAL TESTS:    Imaging Personally Reviewed:    Consultant(s) Notes Reviewed:      Care Discussed with Consultants/Other Providers:   Patient is a 66y old  Female who presents with a chief complaint of SOB (08 Jun 2021 18:26)      SUBJECTIVE / OVERNIGHT EVENTS:    Pt reports feeling extremely fatiged this morning. Wishes to see PT to build her strength. Reports she needs the 1L NC for comfort. Denies fevers, chills, chest p/p, sob, n/v/d.     MEDICATIONS  (STANDING):  albumin human 25% IVPB 100 milliLiter(s) IV Intermittent every 6 hours  bisacodyl 5 milliGRAM(s) Oral at bedtime  enoxaparin Injectable 40 milliGRAM(s) SubCutaneous daily  folic acid 1 milliGRAM(s) Oral daily  melatonin 3 milliGRAM(s) Oral at bedtime  ondansetron Injectable 4 milliGRAM(s) IV Push once    MEDICATIONS  (PRN):  acetaminophen   Tablet .. 650 milliGRAM(s) Oral every 6 hours PRN Mild Pain (1 - 3), Moderate Pain (4 - 6), Severe Pain (7 - 10)      Vital Signs Last 24 Hrs  T(C): 36.7 (09 Jun 2021 06:47), Max: 36.7 (08 Jun 2021 14:18)  T(F): 98 (09 Jun 2021 06:47), Max: 98 (08 Jun 2021 14:18)  HR: 128 (09 Jun 2021 06:47) (128 - 140)  BP: 126/65 (09 Jun 2021 06:47) (110/57 - 126/65)  BP(mean): --  RR: 21 (09 Jun 2021 06:47) (17 - 21)  SpO2: 94% (09 Jun 2021 06:47) (92% - 98%)  CAPILLARY BLOOD GLUCOSE    I&O's Summary    PHYSICAL EXAM:  GENERAL: NAD, well-developed  HEAD:  Atraumatic, Normocephalic  EYES: EOMI, PERRLA, conjunctiva and sclera clear  NECK: Supple, No JVD  CHEST/LUNG: Clear to auscultation bilaterally; No wheeze  HEART: Regular rate and rhythm; No murmurs, rubs, or gallops  ABDOMEN: Tight, distended, no bowel sounds present   EXTREMITIES:  2+ Peripheral Pulses, No clubbing, cyanosis, or edema  PSYCH: AAOx3  NEUROLOGY: non-focal  SKIN: No rashes or lesions    LABS:                        11.4   10.57 )-----------( 436      ( 08 Jun 2021 06:57 )             35.2     06-08    130<L>  |  89<L>  |  57<H>  ----------------------------<  100<H>  3.7   |  24  |  1.13    Ca    10.8<H>      08 Jun 2021 06:57  Phos  3.7     06-07  Mg     2.3     06-07    TPro  6.1  /  Alb  3.0<L>  /  TBili  0.6  /  DBili  x   /  AST  30  /  ALT  10  /  AlkPhos  101  06-08    PT/INR - ( 08 Jun 2021 06:57 )   PT: 16.4 sec;   INR: 1.46 ratio         PTT - ( 08 Jun 2021 06:57 )  PTT:32.7 sec          RADIOLOGY & ADDITIONAL TESTS:    Imaging Personally Reviewed:    Consultant(s) Notes Reviewed:      Care Discussed with Consultants/Other Providers:

## 2021-06-09 NOTE — PROGRESS NOTE ADULT - PROBLEM SELECTOR PLAN 5
Likely due to pleural effusions, ascites.   - tsh wnl   - continue to monitor Patient complaining of some mild dysuria associated with trouble urinating. sandoval placed in the ED w/5cc dark yellow urine output per RN notes  - Urine culture ngtd

## 2021-06-09 NOTE — PROGRESS NOTE ADULT - PROBLEM SELECTOR PLAN 1
Moderate volume ascites in CT. SAAG was 1.4 which in setting of imaging points to malignant ascites with portal hypertension. Likely 2/2 ovarian primary, s/p repeat para studies and s/p omental biopsy  Para fluid Gram stain + GPC in pairs, not present on review. DC abx.   - f/u cytopath from para and omental biopsy  -onc and gyn-onc are following  - CEA, CA 19-9 elevated   - low likelihood of SBP at this time Moderate volume ascites in CT. SAAG was 1.4 which in setting of imaging points to malignant ascites with portal hypertension. Likely 2/2 ovarian primary, s/p repeat para studies and s/p omental biopsy  Para fluid Gram stain + GPC in pairs, not present on review. DC abx.   - f/u cytopath from para and omental biopsy  -onc and gyn-onc are following  - CEA, CA 19-9 elevated   - >250 segmented neutrophils in ascitic fluid, likely 2/2 to cancer but possibly 2/2 to SBP  - will treat w/ ceftriaxone 2g,   - f/u ID recs

## 2021-06-10 NOTE — MEDICAL STUDENT PROGRESS NOTE(EDUCATION) - SUBJECTIVE AND OBJECTIVE BOX
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    Subjective:  Patient states that she is more comfortable today as compared to yesterday. She is fatigued and reports generalized weakness. No significant o/n events. She can only keep down liquid food such as juice and Ensure. No nausea or vomiting. No SOB or pain at rest but patient reports SOB and pain when moving. Denies chest pain, headaches, and abdominal pain. Abdomen appears more distended and tighter compared to yesterday. Patient notified of malignant cells on omental biopsy.     Objective:  Vital Signs Last 24 Hrs  T(C): 36.9 (10 Raul 2021 06:05), Max: 37.4 (09 Jun 2021 21:19)  T(F): 98.5 (10 Raul 2021 06:05), Max: 99.4 (09 Jun 2021 21:19)  HR: 135 (10 Raul 2021 06:05) (129 - 139)  BP: 121/62 (10 Raul 2021 06:05) (121/62 - 149/73)  RR: 19 (10 Raul 2021 06:05) (19 - 20)  SpO2: 94% (10 Raul 2021 06:05) (93% - 95%)    MEDICATIONS  (STANDING):  bisacodyl 5 milliGRAM(s) Oral at bedtime  enoxaparin Injectable 40 milliGRAM(s) SubCutaneous daily  folic acid 1 milliGRAM(s) Oral daily  melatonin 3 milliGRAM(s) Oral at bedtime  ondansetron Injectable 4 milliGRAM(s) IV Push once  pantoprazole    Tablet 40 milliGRAM(s) Oral before breakfast    MEDICATIONS  (PRN):  acetaminophen   Tablet .. 650 milliGRAM(s) Oral every 6 hours PRN Mild Pain (1 - 3), Moderate Pain (4 - 6), Severe Pain (7 - 10)      LABS:                        12.2   12.35 )-----------( 513      ( 10 Raul 2021 07:21 )             37.7       06-10    130<L>  |  87<L>  |  76<H>  ----------------------------<  123<H>  3.5   |  22  |  0.88    Ca    12.2<H>      10 Jun 2021 07:21  Phos  2.0     06-10  Mg     2.9     06-10    TPro  6.0  /  Alb  3.4  /  TBili  0.7  /  DBili  x   /  AST  29  /  ALT  5   /  AlkPhos  110  06-10           Culture Results:   No growth (06-08 @ 18:39)  Culture Results:   Testing in progress (06-08 @ 18:39)  Culture Results:   No growth (06-04 @ 15:13)  Culture Results:   No growth at 5 days (06-03 @ 15:54)   67 yo F with PMHx of endometriosis and hyperlipidemia presenting with SOB, abdominal distention and bilateral pleural effusions secondary to 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    Subjective:  Patient states that she is more comfortable today as compared to yesterday. She is fatigued and reports generalized weakness. No significant o/n events. She can only keep down liquid food such as juice and Ensure. No nausea or vomiting. No SOB or pain at rest but patient reports SOB and pain when moving. Denies chest pain, headaches, and abdominal pain. Abdomen appears more distended and tighter compared to yesterday. Patient notified of malignant cells on omental biopsy.     Objective:  Vital Signs Last 24 Hrs  T(C): 36.9 (10 Raul 2021 06:05), Max: 37.4 (09 Jun 2021 21:19)  T(F): 98.5 (10 Raul 2021 06:05), Max: 99.4 (09 Jun 2021 21:19)  HR: 135 (10 Raul 2021 06:05) (129 - 139)  BP: 121/62 (10 Raul 2021 06:05) (121/62 - 149/73)  RR: 19 (10 Raul 2021 06:05) (19 - 20)  SpO2: 94% (10 Raul 2021 06:05) (93% - 95%)    MEDICATIONS  (STANDING):  bisacodyl 5 milliGRAM(s) Oral at bedtime  enoxaparin Injectable 40 milliGRAM(s) SubCutaneous daily  folic acid 1 milliGRAM(s) Oral daily  melatonin 3 milliGRAM(s) Oral at bedtime  ondansetron Injectable 4 milliGRAM(s) IV Push once  pantoprazole    Tablet 40 milliGRAM(s) Oral before breakfast    MEDICATIONS  (PRN):  acetaminophen   Tablet .. 650 milliGRAM(s) Oral every 6 hours PRN Mild Pain (1 - 3), Moderate Pain (4 - 6), Severe Pain (7 - 10)        PHYSICAL EXAM:    Constitutional: Patient appears fatigued.     Respiratory: lungs clear. No wheezes, rales, or rhonchi.     Cardiovascular: Tachycardic with normal S1, S2. No murmurs, rubs, or gallops.     Gastrointestinal: Abdomen very distended and non-tender. No pain, guarding, or rebound tenderness on palpation. Bowel sounds not heard.     Extremities: no edema on lower extremities.     Neurological: A&O x3. Patient is responsive.     Skin: No rashes or lesions.     Musculoskeletal: Generalized weakness. Not ambulatory.       LABS:                        12.2   12.35 )-----------( 513      ( 10 Raul 2021 07:21 )             37.7       06-10    130<L>  |  87<L>  |  76<H>  ----------------------------<  123<H>  3.5   |  22  |  0.88    Ca    12.2<H>      10 Raul 2021 07:21  Phos  2.0     06-10  Mg     2.9     06-10    TPro  6.0  /  Alb  3.4  /  TBili  0.7  /  DBili  x   /  AST  29  /  ALT  5   /  AlkPhos  110  06-10           Culture Results:   No growth (06-08 @ 18:39)  Culture Results:   Testing in progress (06-08 @ 18:39)  Culture Results:   No growth (06-04 @ 15:13)  Culture Results:   No growth at 5 days (06-03 @ 15:54)

## 2021-06-10 NOTE — PROGRESS NOTE ADULT - PROBLEM SELECTOR PLAN 3
Likely causing worsening SOB and from translocation from malignant ascites.  continue to monitor  -lasix 20 mg IV PRN Calcium persistently elevated, now rising to >12  - Low intact PTH and vitamin D, possibly hyper PTHrP vs bony lesions 2/2 malignancy, less likely MM  - f/u PTHrP  - monitor for neurologic deficits if bony lesion suspected, (may progress to spine)  - avoid fluids for now, given pt will 3rd space fluids, PRN albumin as needed

## 2021-06-10 NOTE — CONSULT NOTE ADULT - ATTENDING COMMENTS
67 yo with ascites, adnexal masses, peritoneal carcinomatosis    Clinical history and imaging reviewed  High suspicion for advanced gynecologic malignancy (likely ovarian)  Agree with cytology evaluation for definitive diagnosis  She may benefit from additional paracentesis given abdominal distension with possible IR biopsy of omental disease for tissue diagnosis if cytology is not sufficient  Pending histologic diagnosis, given extent of disease would recommend neoadjuvant chemotherapy   Discussed plan for 3 cycles followed by imaging to evaluate for resectability of disease
Personally saw and examined patient  Labs and vitals reviewed  Agree with above assessment and plan  agree with GOC discussions  cont pain control.
Pt seen examined an dd/w fellow. Agree with above A/P. History as above confirmed / revd with the pt at bedside. Several mo of gradually worsening sxs as above and semiacute worsening of increasing abd girth affecting continence and ability to eat. Pt thought it may have been secondary to Covid vaccine initially but came to ED when sxs worsened. rad w/u as above revd. PE remarkable for markedly distended abd with central abd wall hernia (chronic per pt) NABS, no tenderness or rebound. A/P As above suspect met cancer based on clinical / radiologic finding perhaps of ov/Mullerian vs GI etiology. 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
66 year old presented with shortness of breath and increased abdominal growth    Imaging with concern for peritoneal carcinomatosis    S/p paracentesis of ascites    Gram stain with GPC. ? strep or enterococcus    Suspect bacterial peritonitis    Change ceftriaxone to zosyn (better enterococcus coverage/ also covers anaerobes)  Await Culture data    Gyn onc eval in progress

## 2021-06-10 NOTE — PROGRESS NOTE ADULT - PROBLEM SELECTOR PLAN 7
DVT: Lovenox  Dispo: pending clinical management  Diet: Regular Seen on imaging. Enlarged and heterogeneous appearance of the bilateral adnexa/ovaries  - gyn onc and onc consulted Seen on imaging. Enlarged and heterogeneous appearance of the bilateral adnexa/ovaries  - gyn onc and onc consulted  - pending final biopsy results

## 2021-06-10 NOTE — PROGRESS NOTE ADULT - SUBJECTIVE AND OBJECTIVE BOX
Patient is a 66y old  Female who presents with a chief complaint of SOB (09 Jun 2021 17:39)      SUBJECTIVE / OVERNIGHT EVENTS:    MEDICATIONS  (STANDING):  bisacodyl 5 milliGRAM(s) Oral at bedtime  cefTRIAXone   IVPB 2000 milliGRAM(s) IV Intermittent every 24 hours  enoxaparin Injectable 40 milliGRAM(s) SubCutaneous daily  famotidine    Tablet 20 milliGRAM(s) Oral daily  folic acid 1 milliGRAM(s) Oral daily  melatonin 3 milliGRAM(s) Oral at bedtime  ondansetron Injectable 4 milliGRAM(s) IV Push once    MEDICATIONS  (PRN):  acetaminophen   Tablet .. 650 milliGRAM(s) Oral every 6 hours PRN Mild Pain (1 - 3), Moderate Pain (4 - 6), Severe Pain (7 - 10)      Vital Signs Last 24 Hrs  T(C): 36.9 (10 Raul 2021 06:05), Max: 37.4 (09 Jun 2021 21:19)  T(F): 98.5 (10 Raul 2021 06:05), Max: 99.4 (09 Jun 2021 21:19)  HR: 135 (10 Raul 2021 06:05) (129 - 139)  BP: 121/62 (10 Raul 2021 06:05) (121/62 - 149/73)  BP(mean): --  RR: 19 (10 Raul 2021 06:05) (19 - 20)  SpO2: 94% (10 Raul 2021 06:05) (93% - 95%)  CAPILLARY BLOOD GLUCOSE        I&O's Summary      PHYSICAL EXAM:  GENERAL: NAD, well-developed  HEAD:  Atraumatic, Normocephalic  EYES: EOMI, PERRLA, conjunctiva and sclera clear  NECK: Supple, No JVD  CHEST/LUNG: Clear to auscultation bilaterally; No wheeze  HEART: Regular rate and rhythm; No murmurs, rubs, or gallops  ABDOMEN: Tight, distended, no bowel sounds present   EXTREMITIES:  2+ Peripheral Pulses, No clubbing, cyanosis, or edema  PSYCH: AAOx3  NEUROLOGY: non-focal  SKIN: No rashes or lesions    LABS:                        12.1   12.90 )-----------( 496      ( 09 Jun 2021 08:22 )             36.9     06-09    134<L>  |  88<L>  |  67<H>  ----------------------------<  108<H>  3.7   |  26  |  0.83    Ca    11.5<H>      09 Jun 2021 08:22  Phos  2.5     06-09  Mg     2.8     06-09    TPro  6.1  /  Alb  3.5  /  TBili  0.7  /  DBili  x   /  AST  30  /  ALT  8   /  AlkPhos  110  06-09              RADIOLOGY & ADDITIONAL TESTS:    Imaging Personally Reviewed:    Consultant(s) Notes Reviewed:      Care Discussed with Consultants/Other Providers:   Patient is a 66y old  Female who presents with a chief complaint of SOB (09 Jun 2021 17:39)      SUBJECTIVE / OVERNIGHT EVENTS:    Pt reports that she is feeling better than yesterday, but still fatigued. Denies fevers, chills, chest p/p, sob, n/v/d. Pt notified of malignant cells in recent biopsy.     MEDICATIONS  (STANDING):  bisacodyl 5 milliGRAM(s) Oral at bedtime  cefTRIAXone   IVPB 2000 milliGRAM(s) IV Intermittent every 24 hours  enoxaparin Injectable 40 milliGRAM(s) SubCutaneous daily  famotidine    Tablet 20 milliGRAM(s) Oral daily  folic acid 1 milliGRAM(s) Oral daily  melatonin 3 milliGRAM(s) Oral at bedtime  ondansetron Injectable 4 milliGRAM(s) IV Push once    MEDICATIONS  (PRN):  acetaminophen   Tablet .. 650 milliGRAM(s) Oral every 6 hours PRN Mild Pain (1 - 3), Moderate Pain (4 - 6), Severe Pain (7 - 10)      Vital Signs Last 24 Hrs  T(C): 36.9 (10 Raul 2021 06:05), Max: 37.4 (09 Jun 2021 21:19)  T(F): 98.5 (10 Raul 2021 06:05), Max: 99.4 (09 Jun 2021 21:19)  HR: 135 (10 Raul 2021 06:05) (129 - 139)  BP: 121/62 (10 Raul 2021 06:05) (121/62 - 149/73)  BP(mean): --  RR: 19 (10 Raul 2021 06:05) (19 - 20)  SpO2: 94% (10 Raul 2021 06:05) (93% - 95%)  CAPILLARY BLOOD GLUCOSE        I&O's Summary      PHYSICAL EXAM:  GENERAL: NAD, well-developed  HEAD:  Atraumatic, Normocephalic  EYES: EOMI, PERRLA, conjunctiva and sclera clear  NECK: Supple, No JVD  CHEST/LUNG: Clear to auscultation bilaterally; No wheeze  HEART: Regular rate and rhythm; No murmurs, rubs, or gallops  ABDOMEN: Tight, distended, no bowel sounds present   EXTREMITIES:  2+ Peripheral Pulses, No clubbing, cyanosis, or edema  PSYCH: AAOx3  NEUROLOGY: non-focal  SKIN: No rashes or lesions    LABS:                        12.1   12.90 )-----------( 496      ( 09 Jun 2021 08:22 )             36.9     06-09    134<L>  |  88<L>  |  67<H>  ----------------------------<  108<H>  3.7   |  26  |  0.83    Ca    11.5<H>      09 Jun 2021 08:22  Phos  2.5     06-09  Mg     2.8     06-09    TPro  6.1  /  Alb  3.5  /  TBili  0.7  /  DBili  x   /  AST  30  /  ALT  8   /  AlkPhos  110  06-09              RADIOLOGY & ADDITIONAL TESTS:    Imaging Personally Reviewed:    Consultant(s) Notes Reviewed:      Care Discussed with Consultants/Other Providers:

## 2021-06-10 NOTE — PROGRESS NOTE ADULT - PROBLEM SELECTOR PLAN 1
Moderate volume ascites in CT. SAAG was 1.4 which in setting of imaging points to malignant ascites with portal hypertension. Likely 2/2 ovarian primary, s/p repeat para studies and s/p omental biopsy  Para fluid Gram stain + GPC in pairs, not present on review. DC abx.   - f/u cytopath from para and omental biopsy  -onc and gyn-onc are following  - CEA, CA 19-9 elevated   - >250 segmented neutrophils in ascitic fluid, likely 2/2 to cancer but possibly 2/2 to SBP  - will treat w/ ceftriaxone 2g,   - f/u ID recs Moderate volume ascites in CT. SAAG was 1.4 which in setting of imaging points to malignant ascites with portal hypertension. Likely 2/2 ovarian primary, s/p repeat para studies and s/p omental biopsy  Para fluid Gram stain + GPC in pairs, not present on review. DC abx.   - f/u cytopath from para and omental biopsy  -onc and gyn-onc are following  - CEA,  elevated   - >250 segmented neutrophils in ascitic fluid, likely 2/2 to cancer but possibly 2/2 to SBP  - will treat w/ ceftriaxone 2g,   - f/u ID recs Moderate volume ascites in CT. SAAG was 1.4 which in setting of imaging points to malignant ascites with portal hypertension. Likely 2/2 ovarian primary, s/p repeat para studies and s/p omental biopsy  Para fluid Gram stain + GPC in pairs, not present on review. DC abx.   - initial findings showing malignant cells in omental biopsy, full report pending   -onc and gyn-onc are following  - CEA,  elevated   - >250 segmented neutrophils in ascitic fluid, but cultures negative for organisms, possibly 2/2 to cancer  - f/u ID recs Moderate volume ascites in CT. SAAG was 1.4 which in setting of imaging points to malignant ascites with portal hypertension. Likely 2/2 ovarian primary, s/p repeat para studies and s/p omental biopsy  Para fluid Gram stain + GPC in pairs, not present on review. DC abx.   - initial findings showing malignant cells in omental biopsy, full report pending   -onc and gyn-onc are following  - CEA,  elevated   - >250 segmented neutrophils in ascitic fluid, but cultures negative for organisms, possibly 2/2 to cancer  - ID recs appreciated, will treat for 5d w/ ceftriaxone if possible culture negative SBP

## 2021-06-10 NOTE — PROGRESS NOTE ADULT - PROBLEM SELECTOR PLAN 8
History of constipation earlier on admission, previously on bowel regimen, received intermittent enema  - will hold bowel regimen for tonight   - reintroduce senna and miralax prn DVT: Lovenox  Dispo: pending clinical management  Diet: Regular

## 2021-06-10 NOTE — PROGRESS NOTE ADULT - SUBJECTIVE AND OBJECTIVE BOX
Follow Up:      Inverval History/ROS: Patient is a 66y old  Female who presents with a chief complaint of SOB (10 Raul 2021 13:28)    C/o abd distention  No fever      Allergies    sulfa drugs (Pruritus; Rash)    Intolerances        ANTIMICROBIALS:  cefTRIAXone   IVPB 2000 every 24 hours      OTHER MEDS:  acetaminophen   Tablet .. 650 milliGRAM(s) Oral every 6 hours PRN  bisacodyl 5 milliGRAM(s) Oral at bedtime  enoxaparin Injectable 40 milliGRAM(s) SubCutaneous daily  folic acid 1 milliGRAM(s) Oral daily  melatonin 3 milliGRAM(s) Oral at bedtime  ondansetron Injectable 4 milliGRAM(s) IV Push once  pantoprazole    Tablet 40 milliGRAM(s) Oral before breakfast      Vital Signs Last 24 Hrs  T(C): 36.9 (10 Raul 2021 06:05), Max: 37.4 (09 Jun 2021 21:19)  T(F): 98.5 (10 Arul 2021 06:05), Max: 99.4 (09 Jun 2021 21:19)  HR: 135 (10 Raul 2021 06:05) (129 - 139)  BP: 121/62 (10 Raul 2021 06:05) (121/62 - 149/73)  BP(mean): --  RR: 19 (10 Raul 2021 06:05) (19 - 20)  SpO2: 94% (10 Raul 2021 06:05) (93% - 95%)    PHYSICAL EXAM:  General: [x ] non-toxic  HEAD/EYES: [ ] PERRL [ x] white sclera [ ] icterus  ENT:  [ ] normal [x ] supple [ ] thrush [ ] pharyngeal exudate  Cardiovascular:   [ ] murmur [x ] normal [ ] PPM/AICD  Respiratory:  [ x] clear to ausculation bilaterally  GI:  [ x] soft, distended, tender, normal bowel sounds  :  [ ] sandoval [ ] no CVA tenderness   Musculoskeletal:  [x ] no synovitis  Neurologic:  [x ] non-focal exam   Skin:  x ] no rash  Lymph: [ ] no lymphadenopathy  Psychiatric:  [ ] appropriate affect [x ] alert & oriented  Lines:  [ x] no phlebitis [ ] central line                                12.2   12.35 )-----------( 513      ( 10 Raul 2021 07:21 )             37.7       06-10    130<L>  |  87<L>  |  76<H>  ----------------------------<  123<H>  3.5   |  22  |  0.88    Ca    12.2<H>      10 Raul 2021 07:21  Phos  2.0     06-10  Mg     2.9     06-10    TPro  6.0  /  Alb  3.4  /  TBili  0.7  /  DBili  x   /  AST  29  /  ALT  5   /  AlkPhos  110  06-10          MICROBIOLOGY:Culture Results:   No growth (06-08-21 @ 18:39)  Culture Results:   Testing in progress (06-08-21 @ 18:39)  Culture Results:   No growth (06-04-21 @ 15:13)  Culture Results:   No growth at 5 days (06-03-21 @ 15:54)      RADIOLOGY:

## 2021-06-10 NOTE — MEDICAL STUDENT PROGRESS NOTE(EDUCATION) - NS MD HP STUD ASPLAN PLAN FT
Problem/Plan 1:  Malignant ascites likely 2/2 primary ovarian malignancy. Omental biopsy showed malignant cells but awaiting final path report. SAAG was 1.4, which points to ascites with portal hypertension. Cultures of fluid show GPC in pairs, currently being treated with ceftriaxone. CEA and  elevated. Ascites fluid is bloody with elevated neutrophils.   - Repeat paracentesis for therapeutic and diagnostic purposes if patient's abdomen remains distended   - onc and gyn-onc are currently following  - ID consult for possible SBP  - continue with ceftriaxone, pending urine culture and UA     Problem/Plan 2:  Pleural effusion likely 2/2 translocation of ascites fluid.  - lasix 20 mg IV   - continue to monitor     Problem/Plan 3:  Dyspnea likely 2/2 to pleural effusion and abdominal distension due to the malignant ascites. CT chest shows no evidence of PE, despite patient being persistently tachycardic. Improved after paracentesis removed 2.5L fluid in the ED.   - Administer O2 via nasal cannula as needed  - Repeat paracentesis for therapeutic purposes  - pro-BNP wnl for age, no lower extremity edema, and transthoracic echo findings suggest no cardiac dysfunction     Problem/Plan 4:  Dysuria  - sandoval placed   - urine culture    Problem/Plan 5:  Sinus tachycardia likely due to pleural effusions and ascites.  - TSH wnl  - consult cardiology and follow recommendations   - start on beta blockers   - continue to monitor    Problem/Plan 6:  R/o ovarian carcinoma   - onc and gyn-onc on board     Problem/Plan 7:  Discharge planning issues  - DVT prophylaxis with Lovenox  - Dispo: pending clinical management  - Diet: regular diet       Problem/Plan 1:  Malignant ascites. Omental biopsy showed malignant cells but awaiting final path report. SAAG was 1.4, which points to ascites with portal hypertension. Cultures of fluid show GPC in pairs treated with ceftriaxone. CEA and  elevated. Ascites fluid is bloody with elevated neutrophils.   - Repeat paracentesis for therapeutic and diagnostic purposes if patient's abdomen remains distended   - onc and gyn-onc are currently following  - F/u with ID for possible SBP   - Continue with ceftriaxone for suspicion for culture-negative SBP    Problem/Plan 2:  Sinus tachycardia likely due to pleural effusions and ascites.  - TSH wnl  - consult cardiology and follow recommendations   - continue to monitor    Problem/Plan 3:  Pleural effusion likely 2/2 translocation of ascites fluid.  - lasix 20 mg IV PRN  - continue to monitor     Problem/Plan 4:  Dyspnea likely 2/2 to pleural effusion and abdominal distension due to the malignant ascites. CT chest shows no evidence of PE, despite patient being persistently tachycardic. Improved after paracentesis removed 2.5L fluid in the ED.   - Administer O2 via nasal cannula as needed  - Repeat paracentesis for therapeutic purposes  - pro-BNP wnl for age, no lower extremity edema, and transthoracic echo findings suggest no cardiac dysfunction     Problem/Plan 5:  R/o ovarian carcinoma   - onc and gyn-onc on board     Problem/Plan 6:  Discharge planning issues  - DVT prophylaxis with Lovenox  - Dispo: pending clinical management  - Diet: regular diet       Problem/Plan 1:  Malignant ascites. Omental biopsy showed malignant cells but awaiting final path report. SAAG was 1.4, which points to ascites with portal hypertension. CEA and  elevated. Ascites fluid is bloody with elevated neutrophils. Patient was treated with ceftriaxone.  - Repeat paracentesis for therapeutic and diagnostic purposes if patient's abdomen remains distended   - onc and gyn-onc are currently following  - F/u with ID for possible SBP   - Continue with ceftriaxone for suspicion for culture-negative SBP    Problem/Plan 2:  Sinus tachycardia likely due to pleural effusions and ascites.  - TSH wnl  - consult cardiology and follow recommendations   - continue to monitor    Problem/Plan 3:  Pleural effusion likely 2/2 translocation of ascites fluid.  - lasix 20 mg IV PRN  - continue to monitor     Problem/Plan 4:  Dyspnea likely 2/2 to pleural effusion and abdominal distension due to the malignant ascites. CT chest shows no evidence of PE, despite patient being persistently tachycardic. Improved after paracentesis removed 2.5L fluid in the ED.   - Administer O2 via nasal cannula as needed  - Repeat paracentesis for therapeutic purposes  - pro-BNP wnl for age, no lower extremity edema, and transthoracic echo findings suggest no cardiac dysfunction     Problem/Plan 5:  R/o ovarian carcinoma   - onc and gyn-onc on board     Problem/Plan 6:  Discharge planning issues  - DVT prophylaxis with Lovenox  - Dispo: pending clinical management  - Diet: regular diet

## 2021-06-10 NOTE — CONSULT NOTE ADULT - ASSESSMENT
66 F with PMH of HLD and endometriosis p/w abd distension and fluid studies consistent with likely malignant ascites from presumed ovarian CA c/b pleural effusion. Cardiology consulted for sinus tachycardia.    66 F with PMH of HLD and endometriosis p/w abd distension and fluid studies consistent with likely malignant ascites from presumed ovarian CA c/b pleural effusion. Cardiology consulted for sinus tachycardia.     Sinus tachycardia- multifactorial namely, pain from ascite and dehydration from decreased po intake and third spacing   -would not recommend beta blocker as this would not address the underlying issue   -rehydate as approporiate   -adequate pain control   -PE r/o with CTA chest   -GOC with family per primary team     Thank you for allowing us to participate in the care of your patient. If you have any questions or concerns please do not hesitate to contact us 24/7.     Isabelle Edwards MD x [phone]  Cardiology Fellow, [facility]  All Cardiology service information can be found 24/7 on [email or web address], password: [email or web address]

## 2021-06-10 NOTE — PROGRESS NOTE ADULT - PROBLEM SELECTOR PLAN 6
Seen on imaging. Enlarged and heterogeneous appearance of the bilateral adnexa/ovaries  - gyn onc and onc consulted Patient complaining of some mild dysuria associated with trouble urinating. sandoval placed in the ED w/5cc dark yellow urine output per RN notes  - Urine culture ngtd RESOLVED. Patient complaining of some mild dysuria associated with trouble urinating. sandoval placed in the ED w/5cc dark yellow urine output per RN notes, now urinating w/o difficulty   - Urine culture ngtd

## 2021-06-10 NOTE — PROGRESS NOTE ADULT - NSPROGADDITIONALINFOA_GEN_ALL_CORE
66 y.o. F w/ a hx of endometriosis, HLD who presents with abdominal distension, pain, dysuria, found to have new ascites and peritoneal carcinomatosis c/f GI/ primary s/p IR guided omentum bx and paracentesis 6/8, prelim bx w/ malignant cells.   Patient aware of preliminary bx results and need for therapy. New dyspnea 2/2 abdominal girth.     # Peritoneal carcinomatosis w/ ascites: S/P omentum bx, prelim path + malignant cells. Ascites fluid w/ elevated PMN's though patient has peritoneal carcinomatosis, gram stain and culture ngtd. Will D/C abx for now. Oncology following- possible plans for IP chemo this admission.   # Dyspnea: Check CXray given increasing dyspnea. Per Procedure team, no pocket for para today. O2 for comfort.   # Tachycardia: CTA negative for PE, no infection noted, has mild pain controlled w/ Tylenol, did not improve with fluid challenge, TSH wnl, TTE w/o effusion or LV dysfunction. C/s Cardiology.   # Folic acid deficiency: Cont Folic acid. 66 y.o. F w/ a hx of endometriosis, HLD who presents with abdominal distension, pain, dysuria, found to have new ascites and peritoneal carcinomatosis c/f GI/ primary s/p IR guided omentum bx and paracentesis 6/8, prelim bx w/ malignant cells.   Patient aware of preliminary bx results and need for therapy. New dyspnea 2/2 abdominal girth.     # Peritoneal carcinomatosis w/ ascites: S/P omentum bx, prelim path + malignant cells. Ascites fluid w/ elevated PMN's though patient has peritoneal carcinomatosis, gram stain and culture ngtd. Will D/C abx for now. Oncology following- possible plans for IP chemo this admission.   # Dyspnea: Check CXray given increasing dyspnea. Per Procedure team, no pocket for para today. O2 for comfort.   # Tachycardia: CTA negative for PE, no infection noted, has mild pain controlled w/ Tylenol, did not improve with fluid challenge, TSH wnl, TTE w/o effusion or LV dysfunction. C/s Cardiology.   # Hypercalcemia: Mild noted during admission, PTH, 25 Vit D low. 66 y.o. F w/ a hx of endometriosis, HLD who presents with abdominal distension, pain, dysuria, found to have new ascites and peritoneal carcinomatosis c/f GI/ primary s/p IR guided omentum bx and paracentesis 6/8, prelim bx w/ malignant cells.   Patient aware of preliminary bx results and need for therapy. New dyspnea 2/2 abdominal girth.     # Peritoneal carcinomatosis w/ ascites: S/P omentum bx, prelim path + malignant cells. Ascites fluid w/ elevated PMN's though patient has peritoneal carcinomatosis, gram stain and culture ngtd. Cont CTX x 5d per ID recommendations. Oncology following- possible plans for IP chemo this admission.   # Dyspnea: Check CXray given increasing dyspnea. Per Procedure team, no pocket for para today. O2 for comfort.   # Tachycardia: CTA negative for PE, no infection noted, has mild pain controlled w/ Tylenol, did not improve with fluid challenge, TSH wnl, TTE w/o effusion or LV dysfunction. C/s Cardiology.   # Hypercalcemia: Mild noted during admission, PTH, 25 Vit D low.

## 2021-06-10 NOTE — CHART NOTE - NSCHARTNOTEFT_GEN_A_CORE
US Abd and Lungs reviewed w/ procedure team at bedside. No significant pocket for therapeutic tap seen on US. Minimal pleural fluid seen in bilateral lungs. A-lines found bilaterally. Lung diaphragm elevated superiorly, likely 2/2 to abdominal distension. Will defer therapeutic paracentesis for now. Attending notified    Hyeokchan Kwon , PGY2  Pager: [phone]

## 2021-06-10 NOTE — MEDICAL STUDENT PROGRESS NOTE(EDUCATION) - NS MD HP STUD ASPLAN ASSES FT
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

## 2021-06-10 NOTE — CONSULT NOTE ADULT - SUBJECTIVE AND OBJECTIVE BOX
Patient seen and evaluated at bedside    Chief Complaint:    HPI:  65 y/o female with PMH of HLD who presents to [facility] ED for SOB, couh, weakness and fatigue 2/2 ascites. Patient states she started began feeling increasing fatigue roughly 6 months ago. 4 weeks ago, she received her 1st COVID Moderna vaccination, and then she felt increase fatigue for 2-3 days. Shortly thereafter, 3 weeks ago, she noticed an increase in her abdominal size, which has becoming increasing large. Her abdominal size increased 'quickly', and causing decreased appetite, urinary output and bowel movements. Due to the increasing abdominal pressure, she reports experiencing bouts of urinary incontinence. She further developed epigastric pain. For the last week, she states a history of dry heaves and cough productive of clear phlegm. She states history of hernia, which has been worsened since the increasing size of her belly. Additionally, she tells of burning pain in her right leg, and burning on urination. She denies chest pain.    Last pap smear 15 years ago reportedly wnl; Last mammogram 5 years ago reportedly wnl; No history of colonoscopy in past (pt refused), negative occult testing years ago.    In the Ed the patient was noted to have abdominal distention and paracentesis performed draining 2.5L. CT CAP notable for ascites and peritoneal carcinomatosis, as well as enlarged pelvic masses.    (03 Jun 2021 22:12)      PMHx:   No pertinent past medical history    Hyperlipidemia    Endometriosis        PSHx:   No significant past surgical history    H/O cleft lip        Allergies:  sulfa drugs (Pruritus; Rash)      Home Meds:    Current Medications:   acetaminophen   Tablet .. 650 milliGRAM(s) Oral every 6 hours PRN  bisacodyl 5 milliGRAM(s) Oral at bedtime  enoxaparin Injectable 40 milliGRAM(s) SubCutaneous daily  folic acid 1 milliGRAM(s) Oral daily  melatonin 3 milliGRAM(s) Oral at bedtime  ondansetron Injectable 4 milliGRAM(s) IV Push once  pantoprazole    Tablet 40 milliGRAM(s) Oral before breakfast      FAMILY HISTORY:  FHx: prostate cancer (Father)    FH: breast cancer (Aunt)        Social History: Personally reviewed   No tobacco, EtOH or IVDU     REVIEW OF SYSTEMS:  Constitutional:     [x ] negative [ ] fevers [ ] chills [ ] weight loss [ ] weight gain  HEENT:                  [x ] negative [ ] dry eyes [ ] eye irritation [ ] postnasal drip [ ] nasal congestion  CV:                         [ x] negative  [ ] chest pain [ ] orthopnea [ ] palpitations [ ] murmur  Resp:                     [x ] negative [ ] cough [ ] shortness of breath [ ] dyspnea [ ] wheezing [ ] sputum [ ]hemoptysis  GI:                          [ x] negative [ ] nausea [ ] vomiting [ ] diarrhea [ ] constipation [ ] abd pain [ ] dysphagia   :                        [ x] negative [ ] dysuria [ ] nocturia [ ] hematuria [ ] increased urinary frequency  Musculoskeletal: [x ] negative [ ] back pain [ ] myalgias [ ] arthralgias [ ] fracture  Skin:                       [ x] negative [ ] rash [ ] itch  Neurological:        [ x] negative [ ] headache [ ] dizziness [ ] syncope [ ] weakness [ ] numbness  Psychiatric:           [ x] negative [ ] anxiety [ ] depression  Endocrine:            [ x] negative [ ] diabetes [ ] thyroid problem  Heme/Lymph:      [ x] negative [ ] anemia [ ] bleeding problem  Allergic/Immune: [ x] negative [ ] itchy eyes [ ] nasal discharge [ ] hives [ ] angioedema    [ x] All other systems negative  [ ] Unable to assess ROS due to      Physical Exam:  T(F): 98.5 (06-10), Max: 99.4 (06-09)  HR: 135 (06-10) (129 - 139)  BP: 121/62 (06-10) (121/62 - 149/73)  RR: 19 (06-10)  SpO2: 94% (06-10)    NAD   No JVD   CTABL   Tachycardia, no MRG   Distended   No edema     Cardiovascular Diagnostic Testing:    CONCLUSIONS:  1. Calcified trileaflet aortic valve with normal opening.  2. Normal left ventricular internal dimensions and wall  thicknesses.  3. Endocardium not well visualized; grossly normal left  ventricular systolic function.  4. Normal right ventricular size and function.  5. Estimated pulmonary artery systolic pressure equals 52  mm Hg, assuming right atrial pressure equals 10  mm Hg,  consistent with moderate pulmonary hypertension.  6. Right pleural effusion.      Imaging:      Labs: Personally reviewed                        12.2   12.35 )-----------( 513      ( 10 Raul 2021 07:21 )             37.7     06-10    130<L>  |  87<L>  |  76<H>  ----------------------------<  123<H>  3.5   |  22  |  0.88    Ca    12.2<H>      10 Jun 2021 07:21  Phos  2.0     06-10  Mg     2.9     06-10    TPro  6.0  /  Alb  3.4  /  TBili  0.7  /  DBili  x   /  AST  29  /  ALT  5   /  AlkPhos  110  06-10      Serum Pro-Brain Natriuretic Peptide: 572 pg/mL (06-03 @ 13:38)        Thyroid Stimulating Hormone, Serum: 0.80 uIU/mL (06-03 @ 13:38)

## 2021-06-10 NOTE — PROGRESS NOTE ADULT - PROBLEM SELECTOR PLAN 2
Likely due to pleural effusions, ascites, discomfort, vs hypovolemia, low likelihood of PE,   - CTA negative for PE  - albumin 25% PRN  - tsh wnl   - continue to monitor Likely due to pleural effusions, ascites, discomfort, vs hypovolemia, low likelihood of PE,   - CTA negative for PE  - albumin 25% PRN  - tsh wnl   - continue to monitor  - echo w/ intact LVEF, moderate pulmonary HTN  - closer monitoring in tele no abdominal pain/no nausea/no vomiting Likely due to pleural effusions, ascites, discomfort, vs hypovolemia, low likelihood of PE,   - CTA negative for PE  - albumin 25% PRN  - tsh wnl   - continue to monitor  - echo w/ intact LVEF, moderate pulmonary HTN  - closer monitoring in tele  - will f/u cards c/s

## 2021-06-11 NOTE — PROGRESS NOTE ADULT - PROBLEM SELECTOR PROBLEM 1
Ascites, malignant

## 2021-06-11 NOTE — PROGRESS NOTE ADULT - ATTENDING COMMENTS
66 y.o. F w/ a hx of endometriosis, HLD who presents with abdominal distension, pain, dysuria, found to have new ascites and peritoneal carcinomatosis c/f GI/ primary s/p IR guided omentum bx and paracentesis 6/8, prelim bx w/ malignant cells.   RRT this AM for AMS, tachycardia, WOB. Patient only intermittently responding to questioning, following commands. Tachypneic, altered, lethargic w/ distended abdomen. Ascites fluid + rare yeast.   See GOC discussion above.     # Peritoneal carcinomatosis w/ ascites: S/P omentum bx, prelim path + malignant cells. Ascites fluid w/ elevated PMN's, rare yeast. DC abx as not currently in line with patient's wishes.   # Dyspnea: No pocket for paracentesis noted. Cont Dilaudid PRN dyspnea/ WOB.

## 2021-06-11 NOTE — PROGRESS NOTE ADULT - PROBLEM SELECTOR PLAN 2
Likely due to pleural effusions, ascites, discomfort, vs hypovolemia, low likelihood of PE,   - CTA negative for PE  - albumin 25% PRN  - tsh wnl   - continue to monitor  - echo w/ intact LVEF, moderate pulmonary HTN  - closer monitoring in tele  - will f/u cards c/s

## 2021-06-11 NOTE — PROGRESS NOTE ADULT - PROBLEM SELECTOR PLAN 7
Seen on imaging. Enlarged and heterogeneous appearance of the bilateral adnexa/ovaries  - gyn onc and onc consulted  - pending final biopsy results

## 2021-06-11 NOTE — PROGRESS NOTE ADULT - PROBLEM SELECTOR PLAN 1
Moderate volume ascites in CT. SAAG was 1.4 which in setting of imaging points to malignant ascites with portal hypertension. Likely 2/2 ovarian primary, s/p repeat para studies and s/p omental biopsy  Para fluid Gram stain + GPC in pairs, not present on review. DC abx.   - initial findings showing malignant cells in omental biopsy, full report pending   -onc and gyn-onc are following  - CEA,  elevated   - >250 segmented neutrophils in ascitic fluid, but cultures negative for organisms, possibly 2/2 to cancer  - ID recs appreciated, will treat for 5d w/ ceftriaxone if possible culture negative SBP

## 2021-06-11 NOTE — DISCHARGE NOTE FOR THE EXPIRED PATIENT - HOSPITAL COURSE
65 yo F pmhx HLD, Endometriosis presenting with SOB and abdominal distention likely 2/2 malignant ascites from presumed ovarian primary. Moderate volume ascites  with enlarged and heterogeneous appearance of the bilateral adnexa/ovarie in CT. SAAG was 1.4 which in setting of imaging points to malignant ascites with portal hypertension. 3 weeks of fatigue weakness sob associated with abdominal distention. CT chest without evidence of PE and dyspnea likely 2/2 abdominal distention/ascites and pleural effusions likely from translocation of ascites. Improved after 2.5L paracentesis drain in the ED though the sample was sent without cell counts and cytopathology, couldn't add-on either.  Was initially covered with Zosyn because ascites grew Strep. Zosyn was then d/c. S/p Lymph node biopsy on 6/8. Preliminary read from omental biopsy showing malignant cells. Pt clinical status quickly deteriorated, worsening tachycardia, fatigue, AMS. RRT called on 6/11 for tachycardia and AMS w/ HR in 150s. Pt DNR/DNI by family, made comfort measures. Abx dc'ed, morning labs dc'ed, on IV dilaudid q2, ativan and robinul.     Pt was w/o radial, femoral, and carotid pulse, without spontaneous breath sounds, with fixed pupils, not withdrawing to pain. Pronounced dead at 22:42 on 06/11/2021. 65 yo F pmhx HLD, Endometriosis presenting with SOB and abdominal distention likely 2/2 malignant ascites from presumed ovarian primary. Moderate volume ascites  with enlarged and heterogeneous appearance of the bilateral adnexa/ovarie in CT. SAAG was 1.4 which in setting of imaging points to malignant ascites with portal hypertension. 3 weeks of fatigue weakness sob associated with abdominal distention. CT chest without evidence of PE and dyspnea likely 2/2 abdominal distention/ascites and pleural effusions likely from translocation of ascites. Improved after 2.5L paracentesis drain in the ED though the sample was sent without cell counts and cytopathology, couldn't add-on either.  Was initially covered with Zosyn because ascites grew Strep. Zosyn was then d/c. S/p Lymph node biopsy on . Preliminary read from omental biopsy showing malignant cells. Pt clinical status quickly deteriorated, worsening tachycardia, fatigue, AMS. RRT called on  for tachycardia and AMS w/ HR in 150s. Pt DNR/DNI by family, made comfort measures. Abx dc'ed, morning labs dc'ed, on IV dilaudid q2, ativan and robinul.     Alerted by RN that pt suspected to be . Pt was without radial, femoral, and carotid pulse, without spontaneous breath sounds, with fixed pupils, not withdrawing to pain. Pronounced dead at 22:42 on 2021.

## 2021-06-11 NOTE — PROGRESS NOTE ADULT - ATTENDING COMMENTS
Personally saw and examined patient  Labs and vitals reviewed  Agree with above assessment and plan  remains tachy  palliative c/s, considering morphine tx  d/w medicine team, cardiology will sign off as plan moving towards comfort measures  please call back with questions

## 2021-06-11 NOTE — PROGRESS NOTE ADULT - PROBLEM SELECTOR PLAN 9
History of constipation earlier on admission, previously on bowel regimen, received intermittent enema  - will hold bowel regimen for tonight   - reintroduce senna and miralax prn
History of constipation earlier on admission, previously on bowel regimen, received intermittent enema  - will hold bowel regimen for tonight   - reintroduce senna and miralax prn

## 2021-06-11 NOTE — PROGRESS NOTE ADULT - PROVIDER SPECIALTY LIST ADULT
Infectious Disease
Heme/Onc
Heme/Onc
Infectious Disease
Infectious Disease
Cardiology
Internal Medicine

## 2021-06-11 NOTE — PROGRESS NOTE ADULT - SUBJECTIVE AND OBJECTIVE BOX
Patient is a 66y old  Female who presents with a chief complaint of SOB (11 Jun 2021 09:22)      SUBJECTIVE / OVERNIGHT EVENTS:    MEDICATIONS  (STANDING):  albumin human 25% IVPB 100 milliLiter(s) IV Intermittent every 6 hours  albumin human 25% IVPB 100 milliLiter(s) IV Intermittent once  bisacodyl 5 milliGRAM(s) Oral at bedtime  cefTRIAXone   IVPB 2000 milliGRAM(s) IV Intermittent every 24 hours  enoxaparin Injectable 40 milliGRAM(s) SubCutaneous daily  fluconAZOLE IVPB 200 milliGRAM(s) IV Intermittent every 24 hours  folic acid 1 milliGRAM(s) Oral daily  melatonin 3 milliGRAM(s) Oral at bedtime  ondansetron Injectable 4 milliGRAM(s) IV Push once  pantoprazole    Tablet 40 milliGRAM(s) Oral before breakfast    MEDICATIONS  (PRN):  acetaminophen   Tablet .. 650 milliGRAM(s) Oral every 6 hours PRN Mild Pain (1 - 3), Moderate Pain (4 - 6), Severe Pain (7 - 10)      Vital Signs Last 24 Hrs  T(C): 36.8 (11 Jun 2021 07:24), Max: 36.8 (11 Jun 2021 07:24)  T(F): 98.3 (11 Jun 2021 07:24), Max: 98.3 (11 Jun 2021 07:24)  HR: 145 (11 Jun 2021 07:24) (132 - 145)  BP: 124/68 (11 Jun 2021 07:24) (107/69 - 124/83)  BP(mean): --  RR: 24 (11 Jun 2021 07:24) (20 - 24)  SpO2: 96% (11 Jun 2021 07:24) (93% - 96%)  CAPILLARY BLOOD GLUCOSE      POCT Blood Glucose.: 147 mg/dL (11 Jun 2021 09:07)    I&O's Summary    10 Raul 2021 07:01  -  11 Jun 2021 07:00  --------------------------------------------------------  IN: 300 mL / OUT: 0 mL / NET: 300 mL        PHYSICAL EXAM:  GENERAL: NAD, well-developed  HEAD:  Atraumatic, Normocephalic  EYES: EOMI, PERRLA, conjunctiva and sclera clear  NECK: Supple, No JVD  CHEST/LUNG: Clear to auscultation bilaterally; No wheeze  HEART: Regular rate and rhythm; No murmurs, rubs, or gallops  ABDOMEN: Soft, Nontender, Nondistended; Bowel sounds present  EXTREMITIES:  2+ Peripheral Pulses, No clubbing, cyanosis, or edema  PSYCH: AAOx3  NEUROLOGY: non-focal  SKIN: No rashes or lesions    LABS:                        13.4   13.32 )-----------( 549      ( 11 Jun 2021 03:04 )             41.2     06-11    128<L>  |  85<L>  |  97<H>  ----------------------------<  154<H>  4.2   |  22  |  1.31<H>    Ca    12.5<H>      11 Jun 2021 03:04  Phos  2.0     06-10  Mg     2.9     06-10    TPro  6.3  /  Alb  3.2<L>  /  TBili  0.8  /  DBili  x   /  AST  35<H>  /  ALT  8   /  AlkPhos  136<H>  06-11              RADIOLOGY & ADDITIONAL TESTS:    Imaging Personally Reviewed:    Consultant(s) Notes Reviewed:      Care Discussed with Consultants/Other Providers:

## 2021-06-11 NOTE — PROGRESS NOTE ADULT - PROBLEM SELECTOR PLAN 6
RESOLVED. Patient complaining of some mild dysuria associated with trouble urinating. sandoval placed in the ED w/5cc dark yellow urine output per RN notes, now urinating w/o difficulty   - Urine culture ngtd

## 2021-06-11 NOTE — CONSULT NOTE ADULT - PROBLEM SELECTOR RECOMMENDATION 9
continue o2 support  would add dilaudid 0.2mg iv q 2 hours prn and if requires 3 doses would start low dose gtt at 0.2mg/hr   would add ativan 0.5mg iv q 4 hours prn anxiety  would add robinol 0.4mg iv q 6 hours prn secretions

## 2021-06-11 NOTE — RAPID RESPONSE TEAM SUMMARY - NSSITUATIONBACKGROUNDRRT_GEN_ALL_CORE
65 yo F pmhx HLD, Endometriosis presenting with SOB and abdominal distention likely 2/2 malignant ascites from presumed ovarian primary. Also with b/l pleural effusions. Rapid called for AMS/lethargy and tachycardia 150s.    EKG: sinus tachycardia. Already on fluids. Patient AAOx3 but lethargic, visibly tachypneic. BPs stable. Saturating 94 on 2L. Patient very tachypneic but poor candidate for BIPAP given lethargy. At high risk for decompensation. Primary team spoke with family and decided to make patient DNR/DNI. MICU consulted. Ongoing GOC to be discussed with family per primary team.

## 2021-06-11 NOTE — CONSULT NOTE ADULT - PROBLEM SELECTOR RECOMMENDATION 4
overall prognosis is grave  pt is dnr/dni  family coming in for end of life visit   referral if goals align

## 2021-06-11 NOTE — CHART NOTE - NSCHARTNOTEFT_GEN_A_CORE
Patient's chart reviewed and seen at bedside. s/p RRT, altered and labored breathing. Discussed with sister and sister-in-law at bedside. Family decided on DNR/DNI and comfort measures. Discussed oncologic diagnosis and answered all questions. Emotional support provided.     Please call with any questions/concerns.     Pavel Huff MD   Hematology/Oncology Fellow   pager [phone]   After 5pm and on weekends page on call fellow Patient's chart reviewed and seen at bedside. s/p RRT, altered and labored breathing. Discussed with sister and sister-in-law at bedside. Family decided on DNR/DNI and comfort measures. Discussed oncologic diagnosis and answered all questions. Emotional support provided. Agree with comfort measures. Patient is not a candidate for any DMT and hospice appropriate.     Please call with any questions/concerns.     Pavel Huff MD   Hematology/Oncology Fellow   pager [phone]   After 5pm and on weekends page on call fellow

## 2021-06-11 NOTE — CHART NOTE - NSCHARTNOTEFT_GEN_A_CORE
Discussed w/ family at bedside regarding pt's decompensation. Family would like to proceed w/ comfort measures at this time. Palliative recommendations appreciated. Will dc medications not contributing to patient's comfort including antibiotics at this time. Will dc unnecessary lab work. Attending notified.    Hyeokchan Kwon , PGY2  Pager: [phone]

## 2021-06-11 NOTE — PROGRESS NOTE ADULT - SUBJECTIVE AND OBJECTIVE BOX
Patient seen and examined at bedside.    Overnight Events:   No events overnight. in AM had RRT for sob, tachycardiac in 150s, and MICU eval. Pt DNR DNI now     REVIEW OF SYSTEMS:  Constitutional:     [x ] negative [ ] fevers [ ] chills [ ] weight loss [ ] weight gain  HEENT:                  [x ] negative [ ] dry eyes [ ] eye irritation [ ] postnasal drip [ ] nasal congestion  CV:                         [ x] negative  [ ] chest pain [ ] orthopnea [ ] palpitations [ ] murmur  Resp:                     [ x] negative [ ] cough [ ] shortness of breath [ ] dyspnea [ ] wheezing [ ] sputum [ ]hemoptysis  GI:                          [ x] negative [ ] nausea [ ] vomiting [ ] diarrhea [ ] constipation [ ] abd pain [ ] dysphagia   :                        [ x] negative [ ] dysuria [ ] nocturia [ ] hematuria [ ] increased urinary frequency  Musculoskeletal: [ x] negative [ ] back pain [ ] myalgias [ ] arthralgias [ ] fracture  Skin:                       [ x] negative [ ] rash [ ] itch  Neurological:        [x ] negative [ ] headache [ ] dizziness [ ] syncope [ ] weakness [ ] numbness  Psychiatric:           [ x] negative [ ] anxiety [ ] depression  Endocrine:            [ x] negative [ ] diabetes [ ] thyroid problem  Heme/Lymph:      [ x] negative [ ] anemia [ ] bleeding problem  Allergic/Immune: [ x] negative [ ] itchy eyes [ ] nasal discharge [ ] hives [ ] angioedema    [ x] All other systems negative  [ ] Unable to assess ROS due to    Current Meds:  acetaminophen   Tablet .. 650 milliGRAM(s) Oral every 6 hours PRN  albumin human 25% IVPB 100 milliLiter(s) IV Intermittent every 6 hours  albumin human 25% IVPB 100 milliLiter(s) IV Intermittent once  bisacodyl 5 milliGRAM(s) Oral at bedtime  enoxaparin Injectable 40 milliGRAM(s) SubCutaneous daily  fluconAZOLE IVPB 200 milliGRAM(s) IV Intermittent every 24 hours  folic acid 1 milliGRAM(s) Oral daily  HYDROmorphone  Injectable 0.5 milliGRAM(s) IV Push every 6 hours PRN  HYDROmorphone  Injectable 0.5 milliGRAM(s) IV Push once  melatonin 3 milliGRAM(s) Oral at bedtime  ondansetron Injectable 4 milliGRAM(s) IV Push once  pantoprazole    Tablet 40 milliGRAM(s) Oral before breakfast  piperacillin/tazobactam IVPB. 3.375 Gram(s) IV Intermittent once  piperacillin/tazobactam IVPB.. 3.375 Gram(s) IV Intermittent every 8 hours      PAST MEDICAL & SURGICAL HISTORY:  Hyperlipidemia    Endometriosis    H/O cleft lip  repair as a young child        Vitals:  T(F): 98.4 (06-11), Max: 98.4 (06-11)  HR: 150 (06-11) (132 - 150)  BP: 115/68 (06-11) (107/69 - 124/83)  RR: 24 (06-11)  SpO2: 94% (06-11)  I&O's Summary    10 Raul 2021 07:01  -  11 Jun 2021 07:00  --------------------------------------------------------  IN: 300 mL / OUT: 0 mL / NET: 300 mL        Physical Exam:  NAD   No JVD   CTABL   Tachycardia, no MRG   Distended   No edema                           13.4   13.32 )-----------( 549      ( 11 Jun 2021 03:04 )             41.2     06-11    128<L>  |  85<L>  |  97<H>  ----------------------------<  154<H>  4.2   |  22  |  1.31<H>    Ca    12.5<H>      11 Jun 2021 03:04  Phos  2.0     06-10  Mg     2.9     06-10    TPro  6.3  /  Alb  3.2<L>  /  TBili  0.8  /  DBili  x   /  AST  35<H>  /  ALT  8   /  AlkPhos  136<H>  06-11                  Cardiovascular Testings:       Interpretation of Telemetry:

## 2021-06-11 NOTE — PROGRESS NOTE ADULT - PROBLEM SELECTOR PLAN 3
Calcium persistently elevated, now rising to >12  - Low intact PTH and vitamin D, possibly hyper PTHrP vs bony lesions 2/2 malignancy, less likely MM  - f/u PTHrP  - monitor for neurologic deficits if bony lesion suspected, (may progress to spine)  - avoid fluids for now, given pt will 3rd space fluids, PRN albumin as needed

## 2021-06-11 NOTE — CONSULT NOTE ADULT - ASSESSMENT
65 y/o female with PMH of HLD who presents to [facility] ED for SOB, couh, weakness and fatigue 2/2 ascite- appears to be advaned ovarian ca now in respiratory distress.  ASked to see for symptom management

## 2021-06-11 NOTE — CONSULT NOTE ADULT - SUBJECTIVE AND OBJECTIVE BOX
HPI:  67 y/o female with PMH of HLD who presents to [facility] ED for SOB, couh, weakness and fatigue 2/2 ascites. Patient states she started began feeling increasing fatigue roughly 6 months ago. 4 weeks ago, she received her 1st COVID Moderna vaccination, and then she felt increase fatigue for 2-3 days. Shortly thereafter, 3 weeks ago, she noticed an increase in her abdominal size, which has becoming increasing large. Her abdominal size increased 'quickly', and causing decreased appetite, urinary output and bowel movements. Due to the increasing abdominal pressure, she reports experiencing bouts of urinary incontinence. She further developed epigastric pain. For the last week, she states a history of dry heaves and cough productive of clear phlegm. She states history of hernia, which has been worsened since the increasing size of her belly. Additionally, she tells of burning pain in her right leg, and burning on urination. She denies chest pain.    Last pap smear 15 years ago reportedly wnl; Last mammogram 5 years ago reportedly wnl; No history of colonoscopy in past (pt refused), negative occult testing years ago.    In the Ed the patient was noted to have abdominal distention and paracentesis performed draining 2.5L. CT CAP notable for ascites and peritoneal carcinomatosis, as well as enlarged pelvic masses.    (03 Jun 2021 22:12)    After extensive w/u appears to be advanced ovarian ca, now with acute respiratory failure.  Appears to be actively dying.  Pt's family called and coming in to see patient    Pt unresponsive,  increased work of breathing    PERTINENT PM/SXH:   No pertinent past medical history    Hyperlipidemia    Endometriosis      No significant past surgical history    H/O cleft lip      FAMILY HISTORY:  FHx: prostate cancer (Father)    FH: breast cancer (Aunt)      ITEMS NOT CHECKED ARE NOT PRESENT    SOCIAL HISTORY:   Significant other/partner:  [ ]  Children:  [ ]  [employer]/Spirituality:  Substance hx:  [ ]   Tobacco hx:  [ ]   Alcohol hx: [ ]   Home Opioid hx:  [ ] I-Stop Reference No:  Living Situation: [x ]Home  [ ]Long term care  [ ]Rehab [ ]Other    ADVANCE DIRECTIVES:    DNR  Yes    MOLST  [ ]  Living Will  [ ]   DECISION MAKER(s):  [ ] Health Care Proxy(s)  [ ] Surrogate(s)  [ ] Guardian           Name(s): Phone Number(s): Toya Rodney (sister, brother in law)    BASELINE (I)ADL(s) (prior to admission):  [place]: [x ]Total  [ ] Moderate [ ]Dependent    Allergies    sulfa drugs (Pruritus; Rash)    Intolerances    MEDICATIONS  (STANDING):  albumin human 25% IVPB 100 milliLiter(s) IV Intermittent every 6 hours  albumin human 25% IVPB 100 milliLiter(s) IV Intermittent once  ondansetron Injectable 4 milliGRAM(s) IV Push once    MEDICATIONS  (PRN):  acetaminophen   Tablet .. 650 milliGRAM(s) Oral every 6 hours PRN Mild Pain (1 - 3), Moderate Pain (4 - 6), Severe Pain (7 - 10)  HYDROmorphone  Injectable 0.5 milliGRAM(s) IV Push every 2 hours PRN agitation or work of breathing    PRESENT SYMPTOMS: [x ]Unable to obtain due to poor mentation   Source if other than patient:  [ ]Family   [ ]Team     Pain (Impact on QOL):    Location -         Minimal acceptable level (0-10 scale):                    Aggrevating factors -  Quality -  Radiation -  Severity (0-10 scale) -    Timing -    PAIN AD Score:     [email or web address] (press ctrl +  left click to view)    Dyspnea:                           [ ]Mild [ ]Moderate [ ]Severe  Anxiety:                             [ ]Mild [ ]Moderate [ ]Severe  Fatigue:                             [ ]Mild [ ]Moderate [ ]Severe  Nausea:                             [ ]Mild [ ]Moderate [ ]Severe  Loss of appetite:              [ ]Mild [ ]Moderate [ ]Severe  Constipation:                    [ ]Mild [ ]Moderate [ ]Severe    Other Symptoms:  [ ]All other review of systems negative     Karnofsky Performance Score/Palliative Performance Status Version 2:  20       %  PHYSICAL EXAM:  Vital Signs Last 24 Hrs  T(C): 36.9 (11 Jun 2021 09:54), Max: 36.9 (11 Jun 2021 09:54)  T(F): 98.4 (11 Jun 2021 09:54), Max: 98.4 (11 Jun 2021 09:54)  HR: 141 (11 Jun 2021 10:40) (132 - 150)  BP: 117/65 (11 Jun 2021 10:40) (107/69 - 124/83)  BP(mean): --  RR: 24 (11 Jun 2021 10:40) (20 - 24)  SpO2: 95% (11 Jun 2021 10:40) (93% - 96%) I&O's Summary    10 Raul 2021 07:01  -  11 Jun 2021 07:00  --------------------------------------------------------  IN: 300 mL / OUT: 0 mL / NET: 300 mL    GENERAL:  [x ]Alert  [ ]Oriented x   [ ]Lethargic  [ ]Cachexia  [x ]Unarousable  [ ]Verbal  [x ]Non-Verbal  Behavioral:   [ ] Anxiety  [x ] Delirium [ ] Agitation [ ] Other  HEENT:  [ ]Normal   [ x]Dry mouth   [ ]ET Tube/Trach  [ ]Oral lesions  PULMONARY:   [ ]Clear [ x]Tachypnea  [ ]Audible excessive secretions   [x ]Rhonchi        [ ]Right [ ]Left [x ]Bilateral  [ ]Crackles        [ ]Right [ ]Left [ ]Bilateral  [ ]Wheezing     [ ]Right [ ]Left [ ]Bilateral  CARDIOVASCULAR:    [ ]Regular [ ]Irregular [x ]Tachy  [ ]Carlos [ ]Murmur [ ]Other  GASTROINTESTINAL:  [ ]Soft  [ x]Distended   [ ]+BS  [ ]Non tender [ ]Tender  [ ]PEG [ ]OGT/ NGT  Last BM:   GENITOURINARY:  [ ]Normal [ x] Incontinent   [ ]Oliguria/Anuria   [ ]Cherry  MUSCULOSKELETAL:   [ ]Normal   [ ]Weakness  [x ]Bed/Wheelchair bound [ ]Edema  NEUROLOGIC:   [ ]No focal deficits  [ ] Cognitive impairment  [ ] Dysphagia [ ]Dysarthria [ ] Paresis [x ]Other encephalopathy  SKIN:   [x ]Normal   [ ]Pressure ulcer(s)  [ ]Rash    CRITICAL CARE:  [ ] Shock Present  [ ]Septic [ ]Cardiogenic [ ]Neurologic [ ]Hypovolemic  [ ]  Vasopressors [ ]  Inotropes   [ ] Respiratory failure present  [ ] Acute  [ ] Chronic [ ] Hypoxic  [ ] Hypercarbic [ ] Other  [ ] Other organ failure     LABS:                        13.4   13.32 )-----------( 549      ( 11 Jun 2021 03:04 )             41.2   06-11    128<L>  |  85<L>  |  97<H>  ----------------------------<  154<H>  4.2   |  22  |  1.31<H>    Ca    12.5<H>      11 Jun 2021 03:04  Phos  2.0     06-10  Mg     2.9     06-10    TPro  6.3  /  Alb  3.2<L>  /  TBili  0.8  /  DBili  x   /  AST  35<H>  /  ALT  8   /  AlkPhos  136<H>  06-11        RADIOLOGY & ADDITIONAL STUDIES:    PROTEIN CALORIE MALNUTRITION:   [ ] PPSV2 < or = to 30% [ ] significant weight loss  [ ] poor nutritional intake [ ] catabolic state [ ] anasarca     Albumin, Serum: 3.2 g/dL (06-11-21 @ 03:04)  Artificial Nutrition [ ]     REFERRALS:   [ ]Chaplaincy  [ ] Hospice  [ ]Child Life  [ ]Social Work  [ ]Case management [ ]Holistic Therapy   Goals of Care Discussion Document:

## 2021-06-11 NOTE — PROGRESS NOTE ADULT - CONVERSATION DETAILS
Discussed patient's current clinical status- gradual decline over the last 48 hours and more rapid decline this AM. Workup thus far significant for metastatic malignancy of unknown primary with significant peritoneal carcinomatosis and omental mets. Ascites fluid initially concerning for infection given elevated WBC with isolated yeast noted on cultures this AM. Ascites findings may be 2/2 ongoing gut translocation 2/2 cancer. Given patient's clinical decline, patient's sister elected to make patient DNR/DNI with focus on comfort. All unnecessary lab draws, medications discontinued. Hue requested additional visitation rights so patient's mother can visit.

## 2021-06-11 NOTE — PROGRESS NOTE ADULT - NUTRITIONAL ASSESSMENT
This patient has been assessed with a concern for Malnutrition and has been determined to have a diagnosis/diagnoses of Mild protein-calorie malnutrition.    This patient is being managed with:   Diet Regular-  Supplement Feeding Modality:  Oral  Ensure Enlive Cans or Servings Per Day:  1       Frequency:  Three Times a day  Entered: Jun 5 2021  1:01PM    
This patient has been assessed with a concern for Malnutrition and has been determined to have a diagnosis/diagnoses of Mild protein-calorie malnutrition.    This patient is being managed with:   Diet NPO after Midnight-     NPO Start Date: 07-Jun-2021   NPO Start Time: 23:59  Except Medications  Entered: Jun 7 2021  7:18PM    Diet Regular-  Supplement Feeding Modality:  Oral  Ensure Enlive Cans or Servings Per Day:  1       Frequency:  Three Times a day  Entered: Jun 5 2021  1:01PM    
This patient has been assessed with a concern for Malnutrition and has been determined to have a diagnosis/diagnoses of Mild protein-calorie malnutrition.    This patient is being managed with:   Diet Regular-  Supplement Feeding Modality:  Oral  Ensure Enlive Cans or Servings Per Day:  1       Frequency:  Three Times a day  Entered: Jun 5 2021  1:01PM

## 2021-06-11 NOTE — CHART NOTE - NSCHARTNOTEFT_GEN_A_CORE
Alerted by RN that pt appears to be more lethargic. Examined pt at bedside, A&Ox 3, denies discomfort including SOB or CP. VS at time afebrile, tachycardic 144, /69, RR 22, SpO2% 93 on 1L NC.. Exam w/ lung sounds CTAB, tachycardic (pt persistently tachycardic per review of chart) but regular rhythm no m/r/g. RN relays that pt now at baseline (since beginning of shift) appearance w/ regard to lethargy. Ordered CXR (grossly unchanged from previous, however will need to f/u read), CBC, BMP, VBG. RN to alert w/ any change in clinical status.

## 2021-06-11 NOTE — CHART NOTE - NSCHARTNOTEFT_GEN_A_CORE
Called to bedside by RRT team this AM. In brief, this is a 66 year-old F with PMH HLD and endometriosis who presented to the hospital shortness of breath and abdominal distension in the setting of malignant ascites. RRT called for tachycardia, shortness of breath, and lethargy. Prior to arrival, primary team had discussion with family and patient was made DNR/DNI. On examination, patient is answering questions and endorses weakness and fatigue.     Recommendations  - Would continue discussions with family regarding goals of care  - Consider symptomatic control of dyspnea and pain with hydromorphone  - Per discussion with primary team, no pocket for therapeutic paracentesis on ultrasound yesterday, 6/11  - Consider palliative care consultation    Patient does not require an ICU level of care at this time. Please re-consult as necessary.    Laurent Smith, PGY-5  Pulmonary and Critical Care Medicine  [phone]

## 2021-06-11 NOTE — PROGRESS NOTE ADULT - SUBJECTIVE AND OBJECTIVE BOX
Follow Up:      Inverval History/ROS:Patient is a 66y old  Female who presents with a chief complaint of SOB (11 Jun 2021 12:36)    Events Noted.   Letahrgic, not responsive      Allergies    sulfa drugs (Pruritus; Rash)    Intolerances        ANTIMICROBIALS:      OTHER MEDS:  acetaminophen   Tablet .. 650 milliGRAM(s) Oral every 6 hours PRN  albumin human 25% IVPB 100 milliLiter(s) IV Intermittent every 6 hours  glycopyrrolate Injectable 0.4 milliGRAM(s) IV Push every 6 hours PRN  HYDROmorphone  Injectable 0.2 milliGRAM(s) IV Push every 2 hours PRN  LORazepam   Injectable 0.5 milliGRAM(s) IV Push every 4 hours PRN  ondansetron Injectable 4 milliGRAM(s) IV Push once      Vital Signs Last 24 Hrs  T(C): 36.7 (11 Jun 2021 14:24), Max: 36.9 (11 Jun 2021 09:54)  T(F): 98.1 (11 Jun 2021 14:24), Max: 98.4 (11 Jun 2021 09:54)  HR: 140 (11 Jun 2021 14:24) (132 - 150)  BP: 113/66 (11 Jun 2021 14:24) (107/69 - 124/83)  BP(mean): --  RR: 24 (11 Jun 2021 14:24) (20 - 24)  SpO2: 92% (11 Jun 2021 14:26) (91% - 96%)    PHYSICAL EXAM:  General: [x ] lethargic  HEAD/EYES: [ ] PERRL [ x] white sclera [ ] icterus  ENT:  [ ] normal [ x] supple [ ] thrush [ ] pharyngeal exudate  Cardiovascular:   [ ] murmur [x ] normal [ ] PPM/AICD  Respiratory:  x[ ] clear to ausculation bilaterally  GI:  [x ] soft, distended, normal bowel sounds  :  [ ] sandoval [ ] no CVA tenderness   Musculoskeletal:  [x ] no synovitis  Neurologic:  [ ] non-focal exam   Skin:  [x ] no rash  Lymph: [ ] no lymphadenopathy  Psychiatric:  [ ] appropriate affect [ ] alert & oriented  Lines:  [x ] no phlebitis [ ] central line                                13.4   13.32 )-----------( 549      ( 11 Jun 2021 03:04 )             41.2       06-11    128<L>  |  85<L>  |  97<H>  ----------------------------<  154<H>  4.2   |  22  |  1.31<H>    Ca    12.5<H>      11 Jun 2021 03:04  Phos  2.0     06-10  Mg     2.9     06-10    TPro  6.3  /  Alb  3.2<L>  /  TBili  0.8  /  DBili  x   /  AST  35<H>  /  ALT  8   /  AlkPhos  136<H>  06-11          MICROBIOLOGY:Culture Results:   Rare Yeast (06-08-21 @ 18:39)  Culture Results:   Testing in progress (06-08-21 @ 18:39)  Culture Results:   No growth (06-04-21 @ 15:13)      RADIOLOGY:

## 2021-06-11 NOTE — PROGRESS NOTE ADULT - ASSESSMENT
66 year old presented with shortness of breath and increased abdominal girth 2/2 malignant ascites with concern of GYN malignancy.    Imaging with concern for peritoneal carcinomatosis    S/p paracentesis of ascites    Gram stain initially reported with GPC.  After review, no organism seem  Culture without growth     Remains afebrile  WBC WNL    Monitor off antimicrobials    Gyn onc eval in progress      
66 year old presented with shortness of breath and increased abdominal girth 2/2 malignant ascites with concern of GYN malignancy.  Imaging with concern for peritoneal carcinomatosis    S/p omental biopsy and repeat paracentesis  Cell count with elevated  Agree with empiric ceftriaxone pending culutre/ fluid studies      Gyn onc eval in progress      
66 year old presented with shortness of breath and increased abdominal girth 2/2 malignant ascites with concern of GYN malignancy.  Imaging with concern for peritoneal carcinomatosis    S/p omental biopsy c/w malignancy    S/p paracentesis   -Cell count with elevated  -Culture without grwoth  -? if this is related to malignancy  -Safest course may be empiric SBP tx  - 5 d course of ceftriaxone    Gyn onc eval in progress      
65 y/o female with PMH of HLD who presents to [facility] ED for SOB, cough, weakness and fatigue 2/2 ascites found to have imaging concerning new dx of metastatic malignancy.     #Ascites  #Concern for new metastatic malignancy- suspect GYN primary  - CT chest/abd/pelvis reviewed as above. No PE detected. Noted to have moderate to large b/l pleural effusions with near complete atelectasis of b/l lower lobes, enlarged and heterogeneous appearance of the bilateral adnexa/ovaries, presence of peritoneal carcinomatosis with innumerable peritoneal implants. There is moderate ascites, RP and cardiophrenic adenopathy as well.   - Patient is s/p paracentesis in ER with removal of 2.5L of fluid- gram stain is growing GPC in chains, currently on Ceftriaxone for concern of SBP, f/u ID recs. Cultures negative, patient off antibiotics.   - Appreciate GYN Onc evaluation- rec neoadjuvant chemo   - s/p repeat paracentesis 6/8 with omental biopsy.   - f/u pathology sent from ascitic fluid and omental biopsy: positive for malignant cells, IHC pending    - Diagnosis and treatment planning pending above results. Will tentatively plan for first cycle of chemotherapy prior to discharge. Awaiting final pathology.     Will follow closely.     Pavel Huff MD   Hematology Oncology Fellow  Pager [phone]   After 5pm and on weekends page on call fellow   
66 F with PMH of HLD and endometriosis p/w abd distension and fluid studies consistent with likely malignant ascites from presumed ovarian CA c/b pleural effusion. Cardiology consulted for sinus tachycardia.     Sinus tachycardia- multifactorial namely, pain from ascite and dehydration from decreased po intake and third spacing   -would not recommend beta blocker as this would not address the underlying issue   -rehydate as approporiate   -adequate pain control   -rec comfort care and palliative measures     Discussed with resident team     Thank you for allowing us to participate in the care of your patient. If you have any questions or concerns please do not hesitate to contact us 24/7.     Isabelle Edwards MD x [phone]  Cardiology Fellow, [facility]JULIAN  All Cardiology service information can be found 24/7 on [email or web address], password: [employer]  
65 y/o female with PMH of HLD who presents to [facility] ED for SOB, cough, weakness and fatigue 2/2 ascites found to have imaging concerning new dx of metastatic malignancy.     #Ascites  #Concern for new metastatic malignancy- suspect GYN primary  - CT chest/abd/pelvis reviewed as above. No PE detected. Noted to have moderate to large b/l pleural effusions with near complete atelectasis of b/l lower lobes, enlarged and heterogeneous appearance of the bilateral adnexa/ovaries, presence of peritoneal carcinomatosis with innumerable peritoneal implants. There is moderate ascites, RP and cardiophrenic adenopathy as well.   - Patient is s/p paracentesis in ER with removal of 2.5L of fluid- gram stain is growing GPC in chains, currently on Ceftriaxone for concern of SBP, f/u ID recs. Cultures negative, patient off antibiotics.   - Appreciate GYN Onc evaluation- rec neoadjuvant chemo   - s/p repeat paracentesis 6/8 with omental biopsy.   - f/u pathology sent from ascitic fluid and omental biopsy   - Diagnosis and treatment planning pending above results.     Will follow closely.     Pavel Huff MD   Hematology Oncology Fellow  Pager [phone]   After 5pm and on weekends page on call fellow   
66 year old presented with shortness of breath and increased abdominal girth 2/2 malignant ascites with concern of GYN malignancy.    Imaging with concern for peritoneal carcinomatosis    S/p paracentesis of ascites    Gram stain with GPC. ? strep or enterococcus - so far culture no growth (prelim)  No cell count available  Remains afebrile  WBC WNL  possible bacterial peritonitis    Continue zosyn (better enterococcus coverage/ also covers anaerobes)  Continue to follow culture data    Gyn onc eval in progress    Bhavik Irizarry MD  Pager [phone]  After 5pm/weekends call [phone]  
66 year old presented with shortness of breath and increased abdominal girth 2/2 malignant ascites with concern of GYN malignancy.  Imaging with concern for peritoneal carcinomatosis    S/p omental biopsy c/w malignancy    Ascites culture growing yeast.  Suspect peritonitis related to intra-abd process    Broad spectrum abx including fluconazole are a consideration    But her rapid decline and poor functional status  are concerning.  Patient is liekly actively dying.   Medical team pursuing comfort measures as per their discussions with family.   In light of these goc, reasonable to observe off antimicrobials if amenable with goals of care.     Plan discussed with housestaff          
65 yo F pmhx HLD, Endometriosis presenting with SOB and abdominal distention likely 2/2 malignant ascites from presumed ovarian primary. Also with b/l pleural effusions.
67 yo F pmhx HLD, Endometriosis presenting with SOB and abdominal distention likely 2/2 malignant ascites from presumed ovarian primary. Also with b/l pleural effusions.
65 yo F pmhx HLD, Endometriosis presenting with SOB and abdominal distention likely 2/2 malignant ascites from presumed ovarian primary. Also with b/l pleural effusions.
67 yo F pmhx HLD, Endometriosis presenting with SOB and abdominal distention likely 2/2 malignant ascites from presumed ovarian primary. Also with b/l pleural effusions.
65 yo F pmhx HLD, Endometriosis presenting with SOB and abdominal distention likely 2/2 malignant ascites from presumed ovarian primary. Also with b/l pleural effusions.
67 yo F pmhx HLD, Endometriosis presenting with SOB and abdominal distention likely 2/2 malignant ascites from presumed ovarian primary. Also with b/l pleural effusions.

## 2021-06-11 NOTE — PROGRESS NOTE ADULT - NSICDXPILOT_GEN_ALL_CORE
[place]

## 2021-06-12 NOTE — PROVIDER CONTACT NOTE (OTHER) - RECOMMENDATIONS
MD evaluation
D/C the Cherry.
Replace sandoval
MD should evaluate pt
Notify MD
Pt was placed on a nonrebreather mask at 2155 and RR was 26 and 02 sat was 81%
Notify MD
come assess the patient.

## 2021-06-12 NOTE — PROVIDER CONTACT NOTE (OTHER) - BACKGROUND
Pt HR tachycardic, ascites, pt got paracentesis during my shift
Pt has ascites
Pt tachycardic
patient admitted with Dyspnea.
Patient admitted with dyspnea.
Pt was admitted for dyspnea
Pt was admitted for dyspnea
Ascites, needs paracentesis
Pt sinus tachycardic
Pt was admitted for dyspnea
Pt was admitted for sepsis

## 2021-06-12 NOTE — PROVIDER CONTACT NOTE (OTHER) - REASON
Pt indwelling catheter appear to be leaking( The pad on the bed is wet with urine)
Pt 
Patient Cherry leaking.
Pt HR is144 bpm and HR is 22 bpm and pt appears more lethargic
Pt 
Pt vital signs are unstable.
Patient had a projectile vomiting episode; green in color.
Pt HR is 145 bpm and RR is 24

## 2021-06-12 NOTE — PROVIDER CONTACT NOTE (OTHER) - SITUATION
Pt 
Pt HR is 145 bpm and RR is 24
Patient had a projectile vomiting episode; green in color. Patient had 5 large watery bms after tap water enema.
Pt HR is144 bpm and HR is 22 bpm and pt appears more lethargic
Pt indwelling catheter appear to be leaking( The pad on the bed is wet with urine)
Pt 
Patient Cherry leaking. Patient refused to put a new one.
Pt vital signs are unstable.

## 2021-06-12 NOTE — PROVIDER CONTACT NOTE (OTHER) - ASSESSMENT
Patient had a projectile vomiting episode; green in color. Patient had 5 large watery bms after tap water enema.
Pt HR is144 bpm and HR is 22 bpm and pt appears more lethargic
Pt alert and oriented, no s/s of acute distress. Pt to be going to get paracentesis. Pt in discomfort from ascites.
Pt indwelling catheter appear to be leaking( The pad on the bed is wet with urine)
Pt remains alert and oriented. afebrile. denies pain. no s/s of acute distress or sob.
Patient Cherry leaking. Patient refused to put a new one. Md Pizarro notified.
Pt remains alert and oriented. No complaints of discomfort
Pt remains alert and oriented. No complaints of pain. Pt lethargic but arousable
Vitals @ 2149 T,. 101.7F, , BP 73/38, RR 24, 02 sat 86% 02 5 L NC
Pt HR is 145 bpm and RR is 24
Pt no s/s of acute distress. Pt expressing more comfort post procedure

## 2021-06-12 NOTE — PROVIDER CONTACT NOTE (OTHER) - ACTION/TREATMENT ORDERED:
No additional interventions per MD
Md Pizarro notified that patient refusing to put the new sandoval.
Md Made aware; Md stated will come and assess patient after handoff. Patient turn on the side; Suction setup at the bedside. Aspiration precautions maintained.
MD made aware. Pt getting albumin, pt unable to get fluids due to risk for third spacing. Continue to monitor pt.
Pt refused to have indwelling catheter replace.
No further orders given at moment. will continue to monitor pt
MD will evaluate pt
No further orders given at moment. Continue to monitor pt s/s and HR. Will continue to monitor pt
Pt to get albumin for fluids. Continue to monitor pt and HR. Will continue to monitor pt.
MD evaluated pt and ordered stat labs and chest xray
MD made aware. Pt to still go to IR, will reassess vitals.

## 2021-06-12 NOTE — PROVIDER CONTACT NOTE (OTHER) - DATE AND TIME:
08-Jun-2021 19:55
09-Jun-2021 18:19
10-Raul-2021 02:35
06-Jun-2021 19:20
11-Jun-2021 21:56
09-Jun-2021 21:45
11-Jun-2021 07:30
06-Jun-2021 12:39
08-Jun-2021 12:00
08-Jun-2021 14:20
06-Jun-2021 05:00

## 2021-06-12 NOTE — PROVIDER CONTACT NOTE (OTHER) - NAME OF MD/NP/PA/DO NOTIFIED:
Dr Love was made aware
Ivan
Chaim
Dr Raymond [id number]
Izaiah
Dr Bliss [id number] aware
Dr Bliss [id number] aware
Mo Euceda
Md Wallace
Ivan
Md Pizarro

## 2021-06-13 LAB
CULTURE RESULTS: SIGNIFICANT CHANGE UP
SPECIMEN SOURCE: SIGNIFICANT CHANGE UP

## 2021-06-14 LAB — PTH RELATED PROT SERPL-MCNC: <2 PMOL/L — SIGNIFICANT CHANGE UP

## 2021-06-15 LAB — NON-GYNECOLOGICAL CYTOLOGY STUDY: SIGNIFICANT CHANGE UP

## 2021-07-07 LAB
CULTURE RESULTS: SIGNIFICANT CHANGE UP
SPECIMEN SOURCE: SIGNIFICANT CHANGE UP

## 2024-08-01 NOTE — PROVIDER CONTACT NOTE (CRITICAL VALUE NOTIFICATION) - ACTION/TREATMENT ORDERED:
Refill Decision Note   Jerri Gomez  is requesting a refill authorization.    Brief Assessment and Rationale for Refill:   Approve       Medication Therapy Plan:          Comments:     Note composed:1:47 PM 08/01/2024           
continue to monitor, continue with current antibiotics
No additional interventions presently.

## 2025-06-18 NOTE — DIETITIAN INITIAL EVALUATION ADULT. - PROBLEM/PLAN-1
Patient would like a call back with Follow up questions to 06/16/25 visit   
RN called and spoke to Annette and informed her that she will need to get lab work done sometime next week 6/23-6/27 and again on 7/3. RN let her know that she needs to get labs drawn next week due to her creatine level being elevated and provider wants to monitor. Annette was appreciative of the callback and had no further questions.   
DISPLAY PLAN FREE TEXT